# Patient Record
Sex: FEMALE | Race: BLACK OR AFRICAN AMERICAN | NOT HISPANIC OR LATINO | URBAN - METROPOLITAN AREA
[De-identification: names, ages, dates, MRNs, and addresses within clinical notes are randomized per-mention and may not be internally consistent; named-entity substitution may affect disease eponyms.]

---

## 2018-10-27 ENCOUNTER — EMERGENCY (EMERGENCY)
Facility: HOSPITAL | Age: 58
LOS: 0 days | Discharge: HOME | End: 2018-10-27
Attending: EMERGENCY MEDICINE
Payer: MEDICARE

## 2018-10-27 VITALS
DIASTOLIC BLOOD PRESSURE: 93 MMHG | HEIGHT: 66 IN | WEIGHT: 195.11 LBS | RESPIRATION RATE: 16 BRPM | SYSTOLIC BLOOD PRESSURE: 149 MMHG | TEMPERATURE: 98 F | HEART RATE: 75 BPM | OXYGEN SATURATION: 100 %

## 2018-10-27 VITALS
HEART RATE: 75 BPM | TEMPERATURE: 98 F | RESPIRATION RATE: 18 BRPM | DIASTOLIC BLOOD PRESSURE: 90 MMHG | SYSTOLIC BLOOD PRESSURE: 146 MMHG | OXYGEN SATURATION: 99 %

## 2018-10-27 DIAGNOSIS — S09.90XA UNSPECIFIED INJURY OF HEAD, INITIAL ENCOUNTER: ICD-10-CM

## 2018-10-27 DIAGNOSIS — S00.212A ABRASION OF LEFT EYELID AND PERIOCULAR AREA, INITIAL ENCOUNTER: ICD-10-CM

## 2018-10-27 DIAGNOSIS — Z91.013 ALLERGY TO SEAFOOD: ICD-10-CM
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
## 2018-10-27 DIAGNOSIS — J45.909 UNSPECIFIED ASTHMA, UNCOMPLICATED: ICD-10-CM
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
## 2018-10-27 DIAGNOSIS — Z90.12 ACQUIRED ABSENCE OF LEFT BREAST AND NIPPLE: Chronic | ICD-10-CM

## 2018-10-27 DIAGNOSIS — Z85.3 PERSONAL HISTORY OF MALIGNANT NEOPLASM OF BREAST: ICD-10-CM

## 2018-10-27 DIAGNOSIS — Z91.041 RADIOGRAPHIC DYE ALLERGY STATUS: ICD-10-CM

## 2018-10-27 DIAGNOSIS — E11.9 TYPE 2 DIABETES MELLITUS WITHOUT COMPLICATIONS: ICD-10-CM

## 2018-10-27 DIAGNOSIS — I10 ESSENTIAL (PRIMARY) HYPERTENSION: ICD-10-CM

## 2018-10-27 PROCEDURE — 99284 EMERGENCY DEPT VISIT MOD MDM: CPT

## 2018-10-27 PROCEDURE — 70450 CT HEAD/BRAIN W/O DYE: CPT | Mod: 26

## 2018-10-27 PROCEDURE — 70486 CT MAXILLOFACIAL W/O DYE: CPT | Mod: 26

## 2018-10-27 PROCEDURE — 76377 3D RENDER W/INTRP POSTPROCES: CPT | Mod: 26

## 2018-10-27 RX ORDER — TETANUS TOXOID, REDUCED DIPHTHERIA TOXOID AND ACELLULAR PERTUSSIS VACCINE, ADSORBED 5; 2.5; 8; 8; 2.5 [IU]/.5ML; [IU]/.5ML; UG/.5ML; UG/.5ML; UG/.5ML
0.5 SUSPENSION INTRAMUSCULAR ONCE
Qty: 0 | Refills: 0 | Status: COMPLETED | OUTPATIENT
Start: 2018-10-27 | End: 2018-10-27

## 2018-10-27 RX ORDER — ACETAMINOPHEN 500 MG
650 TABLET ORAL ONCE
Qty: 0 | Refills: 0 | Status: COMPLETED | OUTPATIENT
Start: 2018-10-27 | End: 2018-10-27

## 2018-10-27 RX ADMIN — Medication 650 MILLIGRAM(S): at 19:14

## 2018-10-27 RX ADMIN — Medication 650 MILLIGRAM(S): at 18:18

## 2018-10-27 RX ADMIN — TETANUS TOXOID, REDUCED DIPHTHERIA TOXOID AND ACELLULAR PERTUSSIS VACCINE, ADSORBED 0.5 MILLILITER(S): 5; 2.5; 8; 8; 2.5 SUSPENSION INTRAMUSCULAR at 21:11

## 2018-10-27 RX ADMIN — Medication 650 MILLIGRAM(S): at 21:13

## 2018-10-27 RX ADMIN — Medication 650 MILLIGRAM(S): at 20:28

## 2018-10-27 NOTE — ED ADULT TRIAGE NOTE - CHIEF COMPLAINT QUOTE
Slipped outside of [employer] on wet surface, face left orbit swollen, Positive LOC, abrasion on forehead, nausea

## 2018-10-27 NOTE — ED PROVIDER NOTE - OBJECTIVE STATEMENT
57yo female with pmh HTN, presents s/p slip and fall. It was raining a lot and pt slipped and hit left face and rt hand. + abrasion and swelling left periorbital and left maxilla. Witnssed by family, pt had a "pause for a sec". Pt states no LOC. Tdap utd. No AC.     ROS: No fever/chills. No photophobia/eye pain/changes in vision, No ear pain/sore throat/dysphagia, No chest pain/palpitations. No SOB/cough/stridor. No abdominal pain, N/V/D, no black/bloody bm. No dysuria/frequency/discharge, No headache. No Dizziness. + abrasion, face pain  No numbness/tingling/weakness.

## 2018-10-27 NOTE — ED ADULT NURSE NOTE - NSIMPLEMENTINTERV_GEN_ALL_ED
Implemented All Fall Risk Interventions:  [place] to call system. Call bell, personal items and telephone within reach. Instruct patient to call for assistance. Room bathroom lighting operational. Non-slip footwear when patient is off stretcher. Physically safe environment: no spills, clutter or unnecessary equipment. Stretcher in lowest position, wheels locked, appropriate side rails in place. Provide visual cue, wrist band, yellow gown, etc. Monitor gait and stability. Monitor for mental status changes and reorient to person, place, and time. Review medications for side effects contributing to fall risk. Reinforce activity limits and safety measures with patient and family.

## 2018-10-27 NOTE — ED PROVIDER NOTE - MEDICAL DECISION MAKING DETAILS
57 yo F s/p fall from standing  -ct brain/max shows no evidence of trauma, will d/c with pcp f/u as per plan with prior attending

## 2018-10-27 NOTE — ED ADULT NURSE NOTE - OBJECTIVE STATEMENT
Pt presented to the ed s/p mechanical fall. Positive loc. Pt c/o o headache, neck pain and feeling dizzy. Pt is not on any blood thinner. abraision notedon the left side of the forehead.

## 2018-10-27 NOTE — ED PROVIDER NOTE - PHYSICAL EXAMINATION
Gen: Alert, Well appearing. NAD    Head: NC, AT, PERRL, EOMI, normal lids/conjunctiva   ENT: Bilateral TM WNL, normal hearing, patent oropharynx without erythema/exudate, uvula midline  Neck: supple, no tenderness/meningismus/JVD   Pulm: Bilateral clear BS, normal resp effort, no wheeze/stridor/retractions  CV: RRR, no M/R/G, +dist pulses   Abd: soft, NT/ND, +BS, no guarding/rebound tenderness  Mskel: no edema/erythema/cyanosis   Skin: left lateral periorbital swelling with abrasion to left frontal bone, left maxillla swelling. no crepitus.   Neuro: AAOx3, no sensory/motor deficits, CN 2-12 intact

## 2018-10-27 NOTE — ED PROVIDER NOTE - PROGRESS NOTE DETAILS
Concerning neurological signs that would warrant return to ED were discussed with patient.  Pt. understands to return if severe headache, numbness, weakness, nausea, vomiting, or personality changes develop.  Pt. verbalizes understanding. Results reported to patient--grossly benign, CT shows no evidence of acute injury  Pt. reports feeling better after meds  pt. agrees to f/u with primary care outpt.  pt. understands to return to ED if symptoms worsen; will d/c

## 2019-01-17 ENCOUNTER — TRANSCRIPTION ENCOUNTER (OUTPATIENT)
Age: 59
End: 2019-01-17

## 2019-09-02 ENCOUNTER — INPATIENT (INPATIENT)
Facility: HOSPITAL | Age: 59
LOS: 3 days | Discharge: HOME | DRG: 206 | End: 2019-09-06
Attending: INTERNAL MEDICINE | Admitting: INTERNAL MEDICINE
Payer: COMMERCIAL

## 2019-09-02 VITALS
SYSTOLIC BLOOD PRESSURE: 173 MMHG | WEIGHT: 190.04 LBS | DIASTOLIC BLOOD PRESSURE: 110 MMHG | TEMPERATURE: 98 F | RESPIRATION RATE: 20 BRPM | HEART RATE: 110 BPM | HEIGHT: 66 IN | OXYGEN SATURATION: 97 %

## 2019-09-02 DIAGNOSIS — R07.9 CHEST PAIN, UNSPECIFIED: ICD-10-CM

## 2019-09-02 DIAGNOSIS — Z90.12 ACQUIRED ABSENCE OF LEFT BREAST AND NIPPLE: Chronic | ICD-10-CM

## 2019-09-02 LAB
ALBUMIN SERPL ELPH-MCNC: 3.8 G/DL — SIGNIFICANT CHANGE UP (ref 3.5–5)
ALP SERPL-CCNC: 134 U/L — HIGH (ref 40–120)
ALT FLD-CCNC: 23 U/L DA — SIGNIFICANT CHANGE UP (ref 10–60)
ANION GAP SERPL CALC-SCNC: 4 MMOL/L — LOW (ref 5–17)
APTT BLD: 36.7 SEC — HIGH (ref 27.5–36.3)
AST SERPL-CCNC: 23 U/L — SIGNIFICANT CHANGE UP (ref 10–40)
BILIRUB SERPL-MCNC: 0.2 MG/DL — SIGNIFICANT CHANGE UP (ref 0.2–1.2)
BUN SERPL-MCNC: 10 MG/DL — SIGNIFICANT CHANGE UP (ref 7–18)
CALCIUM SERPL-MCNC: 11 MG/DL — HIGH (ref 8.4–10.5)
CHLORIDE SERPL-SCNC: 110 MMOL/L — HIGH (ref 96–108)
CO2 SERPL-SCNC: 30 MMOL/L — SIGNIFICANT CHANGE UP (ref 22–31)
CREAT SERPL-MCNC: 0.89 MG/DL — SIGNIFICANT CHANGE UP (ref 0.5–1.3)
GLUCOSE SERPL-MCNC: 175 MG/DL — HIGH (ref 70–99)
HCT VFR BLD CALC: 34.4 % — LOW (ref 34.5–45)
HGB BLD-MCNC: 11.2 G/DL — LOW (ref 11.5–15.5)
INR BLD: 0.99 RATIO — SIGNIFICANT CHANGE UP (ref 0.88–1.16)
MCHC RBC-ENTMCNC: 30 PG — SIGNIFICANT CHANGE UP (ref 27–34)
MCHC RBC-ENTMCNC: 32.6 GM/DL — SIGNIFICANT CHANGE UP (ref 32–36)
MCV RBC AUTO: 92.2 FL — SIGNIFICANT CHANGE UP (ref 80–100)
NRBC # BLD: 0 /100 WBCS — SIGNIFICANT CHANGE UP (ref 0–0)
NT-PROBNP SERPL-SCNC: 27 PG/ML — SIGNIFICANT CHANGE UP (ref 0–125)
PLATELET # BLD AUTO: 240 K/UL — SIGNIFICANT CHANGE UP (ref 150–400)
POTASSIUM SERPL-MCNC: 4 MMOL/L — SIGNIFICANT CHANGE UP (ref 3.5–5.3)
POTASSIUM SERPL-SCNC: 4 MMOL/L — SIGNIFICANT CHANGE UP (ref 3.5–5.3)
PROT SERPL-MCNC: 8.4 G/DL — HIGH (ref 6–8.3)
PROTHROM AB SERPL-ACNC: 11 SEC — SIGNIFICANT CHANGE UP (ref 10–12.9)
RBC # BLD: 3.73 M/UL — LOW (ref 3.8–5.2)
RBC # FLD: 13.8 % — SIGNIFICANT CHANGE UP (ref 10.3–14.5)
SODIUM SERPL-SCNC: 144 MMOL/L — SIGNIFICANT CHANGE UP (ref 135–145)
TROPONIN I SERPL-MCNC: <0.015 NG/ML — SIGNIFICANT CHANGE UP (ref 0–0.04)
WBC # BLD: 8.42 K/UL — SIGNIFICANT CHANGE UP (ref 3.8–10.5)
WBC # FLD AUTO: 8.42 K/UL — SIGNIFICANT CHANGE UP (ref 3.8–10.5)

## 2019-09-02 PROCEDURE — 99285 EMERGENCY DEPT VISIT HI MDM: CPT

## 2019-09-02 PROCEDURE — 71045 X-RAY EXAM CHEST 1 VIEW: CPT | Mod: 26

## 2019-09-02 RX ORDER — MORPHINE SULFATE 50 MG/1
4 CAPSULE, EXTENDED RELEASE ORAL ONCE
Refills: 0 | Status: DISCONTINUED | OUTPATIENT
Start: 2019-09-02 | End: 2019-09-02

## 2019-09-02 RX ORDER — ENOXAPARIN SODIUM 100 MG/ML
90 INJECTION SUBCUTANEOUS ONCE
Refills: 0 | Status: COMPLETED | OUTPATIENT
Start: 2019-09-02 | End: 2019-09-02

## 2019-09-02 RX ORDER — DIPHENHYDRAMINE HCL 50 MG
50 CAPSULE ORAL ONCE
Refills: 0 | Status: COMPLETED | OUTPATIENT
Start: 2019-09-03 | End: 2019-09-03

## 2019-09-02 RX ADMIN — MORPHINE SULFATE 4 MILLIGRAM(S): 50 CAPSULE, EXTENDED RELEASE ORAL at 23:44

## 2019-09-02 RX ADMIN — Medication 40 MILLIGRAM(S): at 23:44

## 2019-09-02 NOTE — ED PROVIDER NOTE - CARE PLAN
Principal Discharge DX:	Chest pain, unspecified type  Secondary Diagnosis:	Dyspnea, unspecified type  Secondary Diagnosis:	Palpitations

## 2019-09-02 NOTE — ED PROVIDER NOTE - OBJECTIVE STATEMENT
57 y/o woman, h/o HTN, DM, asthma, breast cancer and lumpectomy, now in remission, c/o left chest pain (pleuritic), shortness of breath, palpitations since about 7 pm, started at rest.  Pt also reports dizziness, nausea, headache as well.  No fever/cough/hemoptysis/leg pain/swelling/vomiting/syncope.  No recent travel, no h/o DVT/PE/ACS, no use of hormonal medications.  Pt has h/o allergy to iodine and shellfish, possible IV contrast allergy.

## 2019-09-02 NOTE — ED ADULT NURSE NOTE - OBJECTIVE STATEMENT
The patient presents with left sided chest pain since this AM with sob.  She states that she was in the shower when the pain started.  She is feeling pleuritic chest pain as well, but the chest pain is constant.  She is also with nausea and headache.

## 2019-09-02 NOTE — ED ADULT TRIAGE NOTE - CHIEF COMPLAINT QUOTE
pt compliant of left side chest pain going to the left side of her back that started this afternoon.

## 2019-09-02 NOTE — ED PROVIDER NOTE - CLINICAL SUMMARY MEDICAL DECISION MAKING FREE TEXT BOX
57 y/o woman, h/o HTN, DM, asthma, breast cancer and lumpectomy, now in remission, c/o left chest pain (pleuritic), shortness of breath, palpitations since about 7 pm--EKG, CXR, labs with troponin; moderate suspicion for pulmonary embolism but with h/o allergy, will start steroid prep and give empiric dose of Lovenox while awaiting CTA chest result to r/o PE.

## 2019-09-02 NOTE — ED PROVIDER NOTE - PROGRESS NOTE DETAILS
Informed Dr. Walters and MAR for admission.  Since it will be several hours for steroid prep and PE rule-out and there is moderate-high suspicion of PE, will give one dose of SC Lovenox empirically for now.

## 2019-09-02 NOTE — ED PROVIDER NOTE - MUSCULOSKELETAL, MLM
Spine appears normal, range of motion is not limited, no muscle or joint tenderness; no leg tenderness/swelling

## 2019-09-03 DIAGNOSIS — E11.9 TYPE 2 DIABETES MELLITUS WITHOUT COMPLICATIONS: ICD-10-CM

## 2019-09-03 DIAGNOSIS — R09.89 OTHER SPECIFIED SYMPTOMS AND SIGNS INVOLVING THE CIRCULATORY AND RESPIRATORY SYSTEMS: ICD-10-CM

## 2019-09-03 DIAGNOSIS — I10 ESSENTIAL (PRIMARY) HYPERTENSION: ICD-10-CM

## 2019-09-03 DIAGNOSIS — Z29.9 ENCOUNTER FOR PROPHYLACTIC MEASURES, UNSPECIFIED: ICD-10-CM

## 2019-09-03 DIAGNOSIS — J45.909 UNSPECIFIED ASTHMA, UNCOMPLICATED: ICD-10-CM

## 2019-09-03 DIAGNOSIS — R06.02 SHORTNESS OF BREATH: ICD-10-CM

## 2019-09-03 DIAGNOSIS — R07.9 CHEST PAIN, UNSPECIFIED: ICD-10-CM

## 2019-09-03 PROBLEM — C50.919 MALIGNANT NEOPLASM OF UNSPECIFIED SITE OF UNSPECIFIED FEMALE BREAST: Chronic | Status: ACTIVE | Noted: 2018-10-27

## 2019-09-03 LAB
24R-OH-CALCIDIOL SERPL-MCNC: 28.3 NG/ML — LOW (ref 30–80)
ALBUMIN SERPL ELPH-MCNC: 3.9 G/DL — SIGNIFICANT CHANGE UP (ref 3.5–5)
ALP SERPL-CCNC: 129 U/L — HIGH (ref 40–120)
ALT FLD-CCNC: 23 U/L DA — SIGNIFICANT CHANGE UP (ref 10–60)
ANION GAP SERPL CALC-SCNC: 3 MMOL/L — LOW (ref 5–17)
AST SERPL-CCNC: 13 U/L — SIGNIFICANT CHANGE UP (ref 10–40)
BASOPHILS # BLD AUTO: 0.02 K/UL — SIGNIFICANT CHANGE UP (ref 0–0.2)
BASOPHILS NFR BLD AUTO: 0.3 % — SIGNIFICANT CHANGE UP (ref 0–2)
BILIRUB SERPL-MCNC: 0.4 MG/DL — SIGNIFICANT CHANGE UP (ref 0.2–1.2)
BUN SERPL-MCNC: 10 MG/DL — SIGNIFICANT CHANGE UP (ref 7–18)
CALCIUM SERPL-MCNC: 10.7 MG/DL — HIGH (ref 8.4–10.5)
CHLORIDE SERPL-SCNC: 112 MMOL/L — HIGH (ref 96–108)
CHOLEST SERPL-MCNC: 147 MG/DL — SIGNIFICANT CHANGE UP (ref 10–199)
CK MB BLD-MCNC: 0.8 % — SIGNIFICANT CHANGE UP (ref 0–3.5)
CK MB CFR SERPL CALC: 1.1 NG/ML — SIGNIFICANT CHANGE UP (ref 0–3.6)
CK SERPL-CCNC: 136 U/L — SIGNIFICANT CHANGE UP (ref 21–215)
CO2 SERPL-SCNC: 29 MMOL/L — SIGNIFICANT CHANGE UP (ref 22–31)
CREAT SERPL-MCNC: 0.88 MG/DL — SIGNIFICANT CHANGE UP (ref 0.5–1.3)
EOSINOPHIL # BLD AUTO: 0 K/UL — SIGNIFICANT CHANGE UP (ref 0–0.5)
EOSINOPHIL NFR BLD AUTO: 0 % — SIGNIFICANT CHANGE UP (ref 0–6)
FOLATE SERPL-MCNC: 19.2 NG/ML — SIGNIFICANT CHANGE UP
GLUCOSE BLDC GLUCOMTR-MCNC: 115 MG/DL — HIGH (ref 70–99)
GLUCOSE BLDC GLUCOMTR-MCNC: 135 MG/DL — HIGH (ref 70–99)
GLUCOSE BLDC GLUCOMTR-MCNC: 152 MG/DL — HIGH (ref 70–99)
GLUCOSE BLDC GLUCOMTR-MCNC: 201 MG/DL — HIGH (ref 70–99)
GLUCOSE BLDC GLUCOMTR-MCNC: 210 MG/DL — HIGH (ref 70–99)
GLUCOSE SERPL-MCNC: 202 MG/DL — HIGH (ref 70–99)
HBA1C BLD-MCNC: 6.6 % — HIGH (ref 4–5.6)
HCT VFR BLD CALC: 34.9 % — SIGNIFICANT CHANGE UP (ref 34.5–45)
HCV AB S/CO SERPL IA: 0.2 S/CO — SIGNIFICANT CHANGE UP (ref 0–0.99)
HCV AB SERPL-IMP: SIGNIFICANT CHANGE UP
HDLC SERPL-MCNC: 75 MG/DL — SIGNIFICANT CHANGE UP
HGB BLD-MCNC: 11.3 G/DL — LOW (ref 11.5–15.5)
IMM GRANULOCYTES NFR BLD AUTO: 0.3 % — SIGNIFICANT CHANGE UP (ref 0–1.5)
LIPID PNL WITH DIRECT LDL SERPL: 64 MG/DL — SIGNIFICANT CHANGE UP
LYMPHOCYTES # BLD AUTO: 0.54 K/UL — LOW (ref 1–3.3)
LYMPHOCYTES # BLD AUTO: 6.8 % — LOW (ref 13–44)
MAGNESIUM SERPL-MCNC: 2.1 MG/DL — SIGNIFICANT CHANGE UP (ref 1.6–2.6)
MCHC RBC-ENTMCNC: 29.4 PG — SIGNIFICANT CHANGE UP (ref 27–34)
MCHC RBC-ENTMCNC: 32.4 GM/DL — SIGNIFICANT CHANGE UP (ref 32–36)
MCV RBC AUTO: 90.9 FL — SIGNIFICANT CHANGE UP (ref 80–100)
MONOCYTES # BLD AUTO: 0.08 K/UL — SIGNIFICANT CHANGE UP (ref 0–0.9)
MONOCYTES NFR BLD AUTO: 1 % — LOW (ref 2–14)
NEUTROPHILS # BLD AUTO: 7.24 K/UL — SIGNIFICANT CHANGE UP (ref 1.8–7.4)
NEUTROPHILS NFR BLD AUTO: 91.6 % — HIGH (ref 43–77)
NRBC # BLD: 0 /100 WBCS — SIGNIFICANT CHANGE UP (ref 0–0)
PHOSPHATE SERPL-MCNC: 1.7 MG/DL — LOW (ref 2.5–4.5)
PLATELET # BLD AUTO: 249 K/UL — SIGNIFICANT CHANGE UP (ref 150–400)
POTASSIUM SERPL-MCNC: 4.2 MMOL/L — SIGNIFICANT CHANGE UP (ref 3.5–5.3)
POTASSIUM SERPL-SCNC: 4.2 MMOL/L — SIGNIFICANT CHANGE UP (ref 3.5–5.3)
PROT SERPL-MCNC: 8.2 G/DL — SIGNIFICANT CHANGE UP (ref 6–8.3)
RBC # BLD: 3.84 M/UL — SIGNIFICANT CHANGE UP (ref 3.8–5.2)
RBC # FLD: 13.8 % — SIGNIFICANT CHANGE UP (ref 10.3–14.5)
SODIUM SERPL-SCNC: 144 MMOL/L — SIGNIFICANT CHANGE UP (ref 135–145)
TOTAL CHOLESTEROL/HDL RATIO MEASUREMENT: 2 RATIO — LOW (ref 3.3–7.1)
TRIGL SERPL-MCNC: 42 MG/DL — SIGNIFICANT CHANGE UP (ref 10–149)
TROPONIN I SERPL-MCNC: <0.015 NG/ML — SIGNIFICANT CHANGE UP (ref 0–0.04)
TROPONIN I SERPL-MCNC: <0.015 NG/ML — SIGNIFICANT CHANGE UP (ref 0–0.04)
TSH SERPL-MCNC: 0.73 UU/ML — SIGNIFICANT CHANGE UP (ref 0.34–4.82)
VIT B12 SERPL-MCNC: 465 PG/ML — SIGNIFICANT CHANGE UP (ref 232–1245)
WBC # BLD: 7.9 K/UL — SIGNIFICANT CHANGE UP (ref 3.8–10.5)
WBC # FLD AUTO: 7.9 K/UL — SIGNIFICANT CHANGE UP (ref 3.8–10.5)

## 2019-09-03 PROCEDURE — 71275 CT ANGIOGRAPHY CHEST: CPT | Mod: 26

## 2019-09-03 RX ORDER — DEXTROSE 50 % IN WATER 50 %
15 SYRINGE (ML) INTRAVENOUS ONCE
Refills: 0 | Status: DISCONTINUED | OUTPATIENT
Start: 2019-09-03 | End: 2019-09-06

## 2019-09-03 RX ORDER — VERAPAMIL HCL 240 MG
40 CAPSULE, EXTENDED RELEASE PELLETS 24 HR ORAL DAILY
Refills: 0 | Status: DISCONTINUED | OUTPATIENT
Start: 2019-09-03 | End: 2019-09-03

## 2019-09-03 RX ORDER — DEXTROSE 50 % IN WATER 50 %
25 SYRINGE (ML) INTRAVENOUS ONCE
Refills: 0 | Status: DISCONTINUED | OUTPATIENT
Start: 2019-09-03 | End: 2019-09-06

## 2019-09-03 RX ORDER — ENOXAPARIN SODIUM 100 MG/ML
40 INJECTION SUBCUTANEOUS DAILY
Refills: 0 | Status: DISCONTINUED | OUTPATIENT
Start: 2019-09-03 | End: 2019-09-06

## 2019-09-03 RX ORDER — GLUCAGON INJECTION, SOLUTION 0.5 MG/.1ML
1 INJECTION, SOLUTION SUBCUTANEOUS ONCE
Refills: 0 | Status: DISCONTINUED | OUTPATIENT
Start: 2019-09-03 | End: 2019-09-06

## 2019-09-03 RX ORDER — METOPROLOL TARTRATE 50 MG
12.5 TABLET ORAL
Refills: 0 | Status: DISCONTINUED | OUTPATIENT
Start: 2019-09-03 | End: 2019-09-03

## 2019-09-03 RX ORDER — ACETAMINOPHEN 500 MG
650 TABLET ORAL ONCE
Refills: 0 | Status: COMPLETED | OUTPATIENT
Start: 2019-09-03 | End: 2019-09-03

## 2019-09-03 RX ORDER — VERAPAMIL HCL 240 MG
80 CAPSULE, EXTENDED RELEASE PELLETS 24 HR ORAL THREE TIMES A DAY
Refills: 0 | Status: DISCONTINUED | OUTPATIENT
Start: 2019-09-03 | End: 2019-09-04

## 2019-09-03 RX ORDER — INSULIN LISPRO 100/ML
VIAL (ML) SUBCUTANEOUS
Refills: 0 | Status: DISCONTINUED | OUTPATIENT
Start: 2019-09-03 | End: 2019-09-06

## 2019-09-03 RX ORDER — ASPIRIN/CALCIUM CARB/MAGNESIUM 324 MG
81 TABLET ORAL DAILY
Refills: 0 | Status: DISCONTINUED | OUTPATIENT
Start: 2019-09-03 | End: 2019-09-06

## 2019-09-03 RX ORDER — ATORVASTATIN CALCIUM 80 MG/1
40 TABLET, FILM COATED ORAL AT BEDTIME
Refills: 0 | Status: DISCONTINUED | OUTPATIENT
Start: 2019-09-03 | End: 2019-09-06

## 2019-09-03 RX ORDER — INFLUENZA VIRUS VACCINE 15; 15; 15; 15 UG/.5ML; UG/.5ML; UG/.5ML; UG/.5ML
0.5 SUSPENSION INTRAMUSCULAR ONCE
Refills: 0 | Status: COMPLETED | OUTPATIENT
Start: 2019-09-03 | End: 2019-09-03

## 2019-09-03 RX ORDER — SODIUM CHLORIDE 9 MG/ML
1000 INJECTION, SOLUTION INTRAVENOUS
Refills: 0 | Status: DISCONTINUED | OUTPATIENT
Start: 2019-09-03 | End: 2019-09-06

## 2019-09-03 RX ORDER — DEXTROSE 50 % IN WATER 50 %
12.5 SYRINGE (ML) INTRAVENOUS ONCE
Refills: 0 | Status: DISCONTINUED | OUTPATIENT
Start: 2019-09-03 | End: 2019-09-06

## 2019-09-03 RX ORDER — LISINOPRIL 2.5 MG/1
10 TABLET ORAL DAILY
Refills: 0 | Status: DISCONTINUED | OUTPATIENT
Start: 2019-09-03 | End: 2019-09-04

## 2019-09-03 RX ORDER — IPRATROPIUM/ALBUTEROL SULFATE 18-103MCG
3 AEROSOL WITH ADAPTER (GRAM) INHALATION EVERY 8 HOURS
Refills: 0 | Status: DISCONTINUED | OUTPATIENT
Start: 2019-09-03 | End: 2019-09-06

## 2019-09-03 RX ADMIN — MORPHINE SULFATE 4 MILLIGRAM(S): 50 CAPSULE, EXTENDED RELEASE ORAL at 00:15

## 2019-09-03 RX ADMIN — Medication 50 MILLIGRAM(S): at 02:59

## 2019-09-03 RX ADMIN — Medication 12.5 MILLIGRAM(S): at 17:10

## 2019-09-03 RX ADMIN — Medication 650 MILLIGRAM(S): at 22:49

## 2019-09-03 RX ADMIN — Medication 650 MILLIGRAM(S): at 14:40

## 2019-09-03 RX ADMIN — Medication 40 MILLIGRAM(S): at 02:58

## 2019-09-03 RX ADMIN — ENOXAPARIN SODIUM 90 MILLIGRAM(S): 100 INJECTION SUBCUTANEOUS at 00:17

## 2019-09-03 RX ADMIN — ENOXAPARIN SODIUM 40 MILLIGRAM(S): 100 INJECTION SUBCUTANEOUS at 11:42

## 2019-09-03 RX ADMIN — Medication 4: at 11:44

## 2019-09-03 RX ADMIN — Medication 81 MILLIGRAM(S): at 11:42

## 2019-09-03 RX ADMIN — Medication 650 MILLIGRAM(S): at 23:25

## 2019-09-03 RX ADMIN — Medication 2: at 17:11

## 2019-09-03 RX ADMIN — Medication 650 MILLIGRAM(S): at 15:10

## 2019-09-03 RX ADMIN — LISINOPRIL 10 MILLIGRAM(S): 2.5 TABLET ORAL at 16:07

## 2019-09-03 NOTE — PATIENT PROFILE ADULT - STATED REASON FOR ADMISSION
SUBJECTIVE:  Nurse's note reviewed and accepted.   Medications and Allergies reviewed   Laurence Orellana is a 63 year old female who presents today for follow-up of her type 2 diabetes mellitus  She states that she is feeling well; offering no complaints of hypoglycemic symptoms, nausea, vomiting, chest pain, fatigue, heartburn, extremity pain, or change in bladder or bowel habits.  She continues to take the medications listed without adverse reactions or side effects. Home glucose readings are reviewed pt needs new machine and strips. .    Having bilateral foot pain. Sees Podiatry     Weight loss: pt is actively trying.     Eyes: Lasor surgery again on her left eye.     Smoking and is on the quit smoking program. Motivated to quit again. Pt is in the program at [facility].     Hemoglobin A1C   Date Value   02/22/2017 6.1 % (H)   06/24/2015 8.5     CHOLESTEROL (mg/dL)   Date Value   02/22/2017 208 (H)   01/05/2016 149     HDL (mg/dL)   Date Value   02/22/2017 58 (L)   01/05/2016 51     CALCULATED LDL (mg/dL)   Date Value   02/22/2017 135 (H)   01/05/2016 78     TRIGLYCERIDE (mg/dL)   Date Value   02/22/2017 73   01/05/2016 98     No components found for: DLDL  Creatinine (mg/dL)   Date Value   02/22/2017 0.82   01/05/2016 0.82     MICROALBUMIN, UA (TTL) (mg/dL)   Date Value   03/08/2017 1.67   02/23/2016 3.23     MICROALBUMIN/CREATININE (mcg/mg)   Date Value   03/08/2017 27.3   02/23/2016 15.2       Past Medical History:   Diagnosis Date   • Arthritis 2012   • Bunion    • Depression    • Diabetes mellitus (CMS/HCC) 2010   • Difficulty in walking(719.7)    • Gallstones    • Other and unspecified hyperlipidemia    • Rash         Past Surgical History:   Procedure Laterality Date   • Cataract extraction, bilateral  2011    Dr. Segura   • Colonoscopy  6/20/2013    normal colonoscopy, 10yr recall Affi 2023   • Eye surgery     • Hysterectomy  1973    partial        Current Outpatient Prescriptions   Medication Sig Dispense  chest pain Refill   • metformin (GLUCOPHAGE) 1000 MG tablet TAKE 1 TABLET BY MOUTH 2 TIMES DAILY (WITH MEALS). 180 tablet 1   • insulin glargine (LANTUS SOLOSTAR) 100 UNIT/ML pen-injector Decrease to 40 units per day. Please disp one month and adjust amount needed. 15 mL 6   • atorvastatin (LIPITOR) 10 MG tablet TAKE 1 TABLET BY MOUTH EVERY EVENING. 90 tablet 0   • pregabalin (LYRICA) 200 MG capsule Take 1 capsule by mouth 2 times daily. 60 capsule 4   • aspirin 81 MG EC tablet TAKE 1 TABLET BY MOUTH EVERY DAY 30 tablet 11   • Blood Glucose Monitoring Suppl w/Device Kit Test  3 times daily. Give one month supply of meter/strips/lancets as preferred by insurance. Diagnosis: E11.9 1 kit 0   • Blood Glucose Monitoring Suppl Supplies Misc Test 3 times daily. Give 1 month supply test strips as preferred by insurance. Diagnosis: E11.9 Type 2 Diabetes 100 each 11   • Blood Glucose Monitoring Suppl Supplies Misc Test 3 times daily. Give 1 month supply  lancets as preferred by insurance. Diagnosis: E11.9 Type 2 Diabetes 100 each 11   • guaiFENesin (MUCINEX) 600 MG 12 hr tablet Take 2 tablets by mouth 2 times daily. 60 tablet 0   • beclomethasone diprop (QVAR) 40 MCG/ACT inhaler Inhale 1 puff into the lungs 2 times daily. 1 Inhaler 5   • UNIFINE PENTIPS 31G X 6 MM Misc USE WITH LANTUS AT NIGHT 100 each 11   • PROAIR  (90 BASE) MCG/ACT inhaler INHALE TWO PUFFS BY MOUTH EVERY 4 HOURS AS NEEDED FOR SHORTNESS OF BREATH O SILBIDOS 8.5 g 3   • mometasone (NASONEX) 50 MCG/ACT nasal spray Spray 2 sprays in each nostril daily as needed. Indications: Hayfever     • naproxen (NAPROSYN) 500 MG tablet Take 1 tablet by mouth 2 times daily. 60 tablet 6   • DISPENSE Please dispense: One four wheeled walker with seat 1 each 0     No current facility-administered medications for this visit.         OBJECTIVE:  Visit Vitals  /64 (BP Location: [facility] [place], Patient Position: Sitting, Cuff Size: Large Adult)   Pulse 73   Temp 97.9 °F (36.6 °C) (Oral)    Ht 5' 1\" (1.549 m)   Wt 85.7 kg   SpO2 97%   BMI 35.71 kg/m²     General appearance: Healthy  normocephalic  Neck: Supple, w/o adenopathy or cervical masses. No JVD noted. Carotid pulsations palp  Skin: Skin color, texture, turgor are normal. There are no bruises, rashes or lesions.  Chest: Symmetric. Normal motion on quiet respiration. No palpable tenderness.  Lungs: restful breathing effort noted, Percussion normal. Good diaphragmatic excursion. Lungs clear  Heart: negative  Abdomen: soft, non-tender, bowel sounds normal, no masses, hepatomegaly or splenomegaly noted  Extremities: sl bilateral edema non pitting.   Neuro: walks with a cane. Speech is slurred which is ongoing same as in prior visits.      ASSESSMENT:  1. Diabetes  2. Ongoing slurred speech ( seen by Neurology )  3 Foot pain    Plan  1. Discussed stopping alcohol in coping.  2. Pt to follow up with BS  3. Diabetes supplies called in to pharmacy.  4. Follow up for ongoing issues.         Return for diabetes visit in 3 months. Patient is reminded to have blood work done 1 week prior to visit.

## 2019-09-03 NOTE — H&P ADULT - HISTORY OF PRESENT ILLNESS
Pt is a 57 y/o F with PMH of asthma, HTN, Breast cancer, DM, Hypothyroidism who presented with left sided chest  pain and worsening shortness of breath since yesterday. According to the patient,  yesterday evening she initially developed pain on her left shoulder blade and later dull  left chest pain  of 8/10 intensity, radiating across the chest. She  he been having on and off mild shortness of breath since 1 month which was worse yesterday. Shortness of breath increases on exertion. She has some nausea and occasional lightheadedness.   She denied cough, fever, abdominal pain, vomiting and all other review of systems except mentioned above. Pt is a 59 y/o F with PMH of asthma, HTN, Left  Breast cancer on remission (s/p chemo and radiation on 2008), DM, Hypothyroidism who presented with left sided chest  pain and worsening shortness of breath since yesterday. According to the patient,  yesterday evening she initially developed pain on her left shoulder blade and later dull  left chest pain  of 8/10 intensity, radiating across the chest. She  he been having on and off mild shortness of breath since 1 month which was worse yesterday. Shortness of breath increases on exertion. She has some nausea and occasional lightheadedness.   She denied cough, fever, abdominal pain, vomiting and all other review of systems except mentioned above.

## 2019-09-03 NOTE — H&P ADULT - PROBLEM SELECTOR PLAN 6
IMPROVE VTE Individual Risk Assessment  RISK                                                          Points  [  ] Previous VTE                                                3  [  ] Thrombophilia                                             2  [  ] Lower limb paralysis                                    2        (unable to hold up >15 seconds)    [  ] Current Cancer                                             2         (within 6 months)  [  x] Immobilization > 24 hrs                              1  [  ] ICU/CCU stay > 24 hours                            1  [x  ] Age > 60                                                    1  IMPROVE VTE Score _________

## 2019-09-03 NOTE — H&P ADULT - NEUROLOGICAL DETAILS
sensation intact/cranial nerves intact/superficial reflexes intact/alert and oriented x 3/normal strength/deep reflexes intact

## 2019-09-03 NOTE — H&P ADULT - PROBLEM SELECTOR PLAN 5
IMPROVE VTE Individual Risk Assessment  RISK                                                          Points  [  ] Previous VTE                                                3  [  ] Thrombophilia                                             2  [  ] Lower limb paralysis                                    2        (unable to hold up >15 seconds)    [  ] Current Cancer                                             2         (within 6 months)  [  x] Immobilization > 24 hrs                              1  [  ] ICU/CCU stay > 24 hours                            1  [x  ] Age > 60                                                    1  IMPROVE VTE Score _________ Pt  was prescribed metformin 500 BID by her PCP but, she was non compliant   F/u HBA1C  Monitor blood glucose  Continue Sliding scale Humalog

## 2019-09-03 NOTE — CONSULT NOTE ADULT - SUBJECTIVE AND OBJECTIVE BOX
HISTORY OF PRESENT ILLNESS: HPI:  Pt is a 59 y/o F with PMH of asthma, HTN, Left  Breast cancer on remission (s/p chemo and radiation on 2008), DM, Hypothyroidism who presented with left sided chest  pain and worsening shortness of breath since yesterday. According to the patient,  yesterday evening she initially developed pain on her left shoulder blade and later dull  left chest pain  of 8/10 intensity, radiating across the chest. She  he been having on and off mild shortness of breath since 1 month which was worse yesterday. Shortness of breath increases on exertion. She has some nausea and occasional lightheadedness.   She denied cough, fever, abdominal pain, vomiting and all other review of systems except mentioned above.  She denies known history of CHF or CAD reports normal Cath few years ago      PAST MEDICAL & SURGICAL HISTORY:  Breast CA  DM (diabetes mellitus)  HTN (hypertension)  Asthma  H/O left mastectomy          MEDICATIONS:  MEDICATIONS  (STANDING):  ALBUTerol/ipratropium for Nebulization 3 milliLiter(s) Nebulizer every 8 hours  aspirin enteric coated 81 milliGRAM(s) Oral daily  atorvastatin 40 milliGRAM(s) Oral at bedtime  dextrose 5%. 1000 milliLiter(s) (50 mL/Hr) IV Continuous <Continuous>  dextrose 50% Injectable 12.5 Gram(s) IV Push once  dextrose 50% Injectable 25 Gram(s) IV Push once  dextrose 50% Injectable 25 Gram(s) IV Push once  enoxaparin Injectable 40 milliGRAM(s) SubCutaneous daily  influenza   Vaccine 0.5 milliLiter(s) IntraMuscular once  insulin lispro (HumaLOG) corrective regimen sliding scale   SubCutaneous three times a day before meals  metoprolol tartrate 12.5 milliGRAM(s) Oral two times a day      Allergies    iodine (Short breath)  shellfish (Short breath)    Intolerances        FAMILY HISTORY:  No pertinent family history in first degree relatives    Non-contributary for premature coronary disease or sudden cardiac death    SOCIAL HISTORY:    [X ] Non-smoker  [ ] Smoker  [ ] Alcohol      REVIEW OF SYSTEMS:  [X ]chest pain  [ X]shortness of breath  [  ]palpitations  [  ]syncope  [ ]near syncope [ ]upper extremity weakness   [ ] lower extremity weakness  [  ]diplopia  [  ]altered mental status   [  ]fevers  [ ]chills [ ]nausea  [ ]vomitting  [  ]dysphagia    [ ]abdominal pain  [ ]melena  [ ]BRBPR    [  ]epistaxis  [  ]rash    [ ]lower extremity edema        [ X] All others negative	  [ ] Unable to obtain      LABS:	 	    CARDIAC MARKERS:  CARDIAC MARKERS ( 03 Sep 2019 10:39 )  <0.015 ng/mL / x     / 136 U/L / x     / 1.1 ng/mL  CARDIAC MARKERS ( 03 Sep 2019 04:44 )  <0.015 ng/mL / x     / x     / x     / x      CARDIAC MARKERS ( 02 Sep 2019 21:22 )  <0.015 ng/mL / x     / x     / x     / x                                  11.3   7.90  )-----------( 249      ( 03 Sep 2019 04:44 )             34.9     Hb Trend: 11.3<--, 11.2<--    09-03    144  |  112<H>  |  10  ----------------------------<  202<H>  4.2   |  29  |  0.88    Ca    10.7<H>      03 Sep 2019 04:44  Phos  1.7     09-03  Mg     2.1     09-03    TPro  8.2  /  Alb  3.9  /  TBili  0.4  /  DBili  x   /  AST  13  /  ALT  23  /  AlkPhos  129<H>  09-03    Creatinine Trend: 0.88<--, 0.89<--    Coags:  PT/INR - ( 02 Sep 2019 21:22 )   PT: 11.0 sec;   INR: 0.99 ratio         PTT - ( 02 Sep 2019 21:22 )  PTT:36.7 sec    proBNP: Serum Pro-Brain Natriuretic Peptide: 27 pg/mL (09-02 @ 21:22)    Lipid Profile:   HgA1c: Hemoglobin A1C, Whole Blood: 6.6 % (09-03 @ 09:59)    TSH: Thyroid Stimulating Hormone, Serum: 0.73 uU/mL (09-03 @ 04:44)          PHYSICAL EXAM:  T(C): 36.8 (09-03-19 @ 11:01), Max: 36.9 (09-02-19 @ 20:46)  HR: 93 (09-03-19 @ 11:01) (92 - 110)  BP: 149/89 (09-03-19 @ 11:01) (127/82 - 173/110)  RR: 19 (09-03-19 @ 11:01) (17 - 20)  SpO2: 98% (09-03-19 @ 11:01) (96% - 100%)  Wt(kg): --  I&O's Summary      Gen: Appears well in NAD  HEENT:  (-)icterus (-)pallor  CV: N S1 S2 1/6 THERESA (+)2 Pulses B/l  Resp:  Clear to ausculatation B/L, normal effort  GI: (+) BS Soft, NT, ND  Lymph:  (-)Edema, (-)obvious lymphadenopathy  Skin: Warm to touch, Normal turgor  Psych: Appropriate mood and affect        TELEMETRY: 	  Sinus    ECG:  	Sinus Tach IRBBB 108 BPM    RADIOLOGY:         CXR:   No infiltrate     ASSESSMENT/PLAN: 	58y Female   PMH of asthma, HTN, Left  Breast cancer on remission (s/p chemo and radiation on 2008), DM, Hypothyroidism who presented with left sided chest  pain and worsening shortness of breath since yesterday r/o for PE and MI.    - F/u echo  - Plan for stress in AM if no pertinent findings on echo    I once again thank you for allowing me to participate in the care of your patient.  If you have any questions or concerns please do not hesitate to contact me.      Joel Gruber MD, [facility]  BEEPER [phone]

## 2019-09-03 NOTE — H&P ADULT - PROBLEM SELECTOR PLAN 3
Pt uses catapres patch, ramipril. She mentions using verapamil for HTN. Not sure about her dose.  Monitor BP Pt has h/o asthma and uses Breo as needed at home  Continue bronchodilator

## 2019-09-03 NOTE — H&P ADULT - ASSESSMENT
Pt is a 59 y/o F with PMH of asthma, HTN, Breast cancer, DM, Hypothyroidism who presented with left sided chest  pain and worsening shortness of breath since yesterday.                          11.2   8.42  )-----------( 240      ( 02 Sep 2019 21:22 )             34.4       09-02    144  |  110<H>  |  10  ----------------------------<  175<H>  4.0   |  30  |  0.89    Ca    11.0<H>      02 Sep 2019 21:22    TPro  8.4<H>  /  Alb  3.8  /  TBili  0.2  /  DBili  x   /  AST  23  /  ALT  23  /  AlkPhos  134<H>  09-02                                      3

## 2019-09-03 NOTE — H&P ADULT - NSHPSOCIALHISTORY_GEN_ALL_CORE
Pt used to inhale cocaine, quit 10 years back. She denied smoking cigarettes. She occasionally drinks alcohol

## 2019-09-03 NOTE — H&P ADULT - PROBLEM SELECTOR PLAN 4
Pt  was prescribed metformin 500 BID by her PCP but, she was non compliant   F/u HBA1C  Monitor blood glucose  Continue Sliding scale Humalog Pt uses catapres patch, ramipril. She mentions using verapamil for HTN. Not sure about her dose.  Monitor BP

## 2019-09-03 NOTE — H&P ADULT - PROBLEM SELECTOR PLAN 1
Likely musculoskeletal chest pain as tenderness on palpation on left chest.  EKG showed sinus tachycardia   Troponin T1 je  F/u serial cardiac enzymes  Continue aspirin and statin  F/u Echocardiogram

## 2019-09-03 NOTE — CHART NOTE - NSCHARTNOTEFT_GEN_A_CORE
paged by rn that pt is concerned why she isnt getting home BP medication. Morning team to call pharmacy and check the dose of verapamil. Pt isn't sure about the dose. starting her with 80mg TID for now

## 2019-09-03 NOTE — H&P ADULT - PROBLEM SELECTOR PLAN 2
Pt has worsening shortness of breath since yesterday  Suspected PE as pt has h/o breast cancer, sinus tachycardia. Low wells score.  pt is allergic to contrast.   CTA chest  after premedication with steroids and benadryl Pt has worsening shortness of breath since yesterday  Suspected PE as pt has h/o breast cancer, sinus tachycardia. Low wells score.  pt is allergic to contrast. CTA chest  done after premedication with steroids and benadryl.   F/u CTA chest

## 2019-09-04 ENCOUNTER — TRANSCRIPTION ENCOUNTER (OUTPATIENT)
Age: 59
End: 2019-09-04

## 2019-09-04 LAB
GLUCOSE BLDC GLUCOMTR-MCNC: 121 MG/DL — HIGH (ref 70–99)
GLUCOSE BLDC GLUCOMTR-MCNC: 135 MG/DL — HIGH (ref 70–99)
GLUCOSE BLDC GLUCOMTR-MCNC: 137 MG/DL — HIGH (ref 70–99)
GLUCOSE BLDC GLUCOMTR-MCNC: 172 MG/DL — HIGH (ref 70–99)
HBA1C BLD-MCNC: 6.7 % — HIGH (ref 4–5.6)

## 2019-09-04 RX ORDER — AMLODIPINE BESYLATE 2.5 MG/1
10 TABLET ORAL DAILY
Refills: 0 | Status: DISCONTINUED | OUTPATIENT
Start: 2019-09-04 | End: 2019-09-06

## 2019-09-04 RX ORDER — ATORVASTATIN CALCIUM 80 MG/1
1 TABLET, FILM COATED ORAL
Qty: 30 | Refills: 0
Start: 2019-09-04 | End: 2019-10-03

## 2019-09-04 RX ORDER — ASPIRIN/CALCIUM CARB/MAGNESIUM 324 MG
1 TABLET ORAL
Qty: 30 | Refills: 0
Start: 2019-09-04 | End: 2019-10-03

## 2019-09-04 RX ORDER — LOSARTAN POTASSIUM 100 MG/1
50 TABLET, FILM COATED ORAL DAILY
Refills: 0 | Status: DISCONTINUED | OUTPATIENT
Start: 2019-09-04 | End: 2019-09-06

## 2019-09-04 RX ORDER — ACETAMINOPHEN 500 MG
650 TABLET ORAL EVERY 6 HOURS
Refills: 0 | Status: DISCONTINUED | OUTPATIENT
Start: 2019-09-04 | End: 2019-09-06

## 2019-09-04 RX ORDER — VERAPAMIL HCL 240 MG
1 CAPSULE, EXTENDED RELEASE PELLETS 24 HR ORAL
Qty: 0 | Refills: 0 | DISCHARGE
Start: 2019-09-04

## 2019-09-04 RX ORDER — HYDROCHLOROTHIAZIDE 25 MG
12.5 TABLET ORAL DAILY
Refills: 0 | Status: DISCONTINUED | OUTPATIENT
Start: 2019-09-04 | End: 2019-09-06

## 2019-09-04 RX ORDER — VERAPAMIL HCL 240 MG
80 CAPSULE, EXTENDED RELEASE PELLETS 24 HR ORAL THREE TIMES A DAY
Refills: 0 | Status: DISCONTINUED | OUTPATIENT
Start: 2019-09-04 | End: 2019-09-04

## 2019-09-04 RX ORDER — LISINOPRIL 2.5 MG/1
10 TABLET ORAL DAILY
Refills: 0 | Status: DISCONTINUED | OUTPATIENT
Start: 2019-09-04 | End: 2019-09-04

## 2019-09-04 RX ORDER — ACETAMINOPHEN 500 MG
2 TABLET ORAL
Qty: 0 | Refills: 0 | DISCHARGE
Start: 2019-09-04

## 2019-09-04 RX ORDER — LISINOPRIL 2.5 MG/1
1 TABLET ORAL
Qty: 14 | Refills: 0
Start: 2019-09-04 | End: 2019-09-17

## 2019-09-04 RX ADMIN — Medication 2: at 17:10

## 2019-09-04 RX ADMIN — ATORVASTATIN CALCIUM 40 MILLIGRAM(S): 80 TABLET, FILM COATED ORAL at 21:31

## 2019-09-04 RX ADMIN — Medication 650 MILLIGRAM(S): at 14:30

## 2019-09-04 RX ADMIN — Medication 3 MILLILITER(S): at 14:55

## 2019-09-04 RX ADMIN — ENOXAPARIN SODIUM 40 MILLIGRAM(S): 100 INJECTION SUBCUTANEOUS at 11:26

## 2019-09-04 RX ADMIN — Medication 650 MILLIGRAM(S): at 13:48

## 2019-09-04 RX ADMIN — Medication 80 MILLIGRAM(S): at 21:31

## 2019-09-04 RX ADMIN — Medication 80 MILLIGRAM(S): at 06:13

## 2019-09-04 RX ADMIN — Medication 81 MILLIGRAM(S): at 11:29

## 2019-09-04 RX ADMIN — LISINOPRIL 10 MILLIGRAM(S): 2.5 TABLET ORAL at 17:10

## 2019-09-04 RX ADMIN — Medication 80 MILLIGRAM(S): at 14:21

## 2019-09-04 RX ADMIN — AMLODIPINE BESYLATE 10 MILLIGRAM(S): 2.5 TABLET ORAL at 23:10

## 2019-09-04 NOTE — PROGRESS NOTE ADULT - SUBJECTIVE AND OBJECTIVE BOX
Patient is a 58y old  Female who presents with a chief complaint of chest pain (04 Sep 2019 13:03)    PATIENT IS SEEN AND EXAMINED IN MEDICAL FLOOR.    ALLERGIES:  iodine (Short breath)  shellfish (Short breath)  Daily Weight in k.9 (04 Sep 2019 00:59)    VITALS:    Vital Signs Last 24 Hrs  T(C): 36.7 (04 Sep 2019 21:22), Max: 37.1 (04 Sep 2019 18:17)  T(F): 98.1 (04 Sep 2019 21:22), Max: 98.8 (04 Sep 2019 18:17)  HR: 75 (04 Sep 2019 21:22) (72 - 90)  BP: 141/89 (04 Sep 2019 21:22) (138/92 - 148/102)  BP(mean): --  RR: 16 (04 Sep 2019 21:22) (16 - 18)  SpO2: 100% (04 Sep 2019 21:22) (96% - 100%)    LABS:    CBC Full  -  ( 03 Sep 2019 04:44 )  WBC Count : 7.90 K/uL  RBC Count : 3.84 M/uL  Hemoglobin : 11.3 g/dL  Hematocrit : 34.9 %  Platelet Count - Automated : 249 K/uL  Mean Cell Volume : 90.9 fl  Mean Cell Hemoglobin : 29.4 pg  Mean Cell Hemoglobin Concentration : 32.4 gm/dL  Auto Neutrophil # : 7.24 K/uL  Auto Lymphocyte # : 0.54 K/uL  Auto Monocyte # : 0.08 K/uL  Auto Eosinophil # : 0.00 K/uL  Auto Basophil # : 0.02 K/uL  Auto Neutrophil % : 91.6 %  Auto Lymphocyte % : 6.8 %  Auto Monocyte % : 1.0 %  Auto Eosinophil % : 0.0 %  Auto Basophil % : 0.3 %          144  |  112<H>  |  10  ----------------------------<  202<H>  4.2   |  29  |  0.88    Ca    10.7<H>      03 Sep 2019 04:44  Phos  1.7       Mg     2.1         TPro  8.2  /  Alb  3.9  /  TBili  0.4  /  DBili  x   /  AST  13  /  ALT  23  /  AlkPhos  129<H>      CAPILLARY BLOOD GLUCOSE      POCT Blood Glucose.: 137 mg/dL (04 Sep 2019 21:57)  POCT Blood Glucose.: 172 mg/dL (04 Sep 2019 16:54)  POCT Blood Glucose.: 135 mg/dL (04 Sep 2019 11:57)  POCT Blood Glucose.: 121 mg/dL (04 Sep 2019 07:45)    CARDIAC MARKERS ( 03 Sep 2019 10:39 )  <0.015 ng/mL / x     / 136 U/L / x     / 1.1 ng/mL  CARDIAC MARKERS ( 03 Sep 2019 04:44 )  <0.015 ng/mL / x     / x     / x     / x          LIVER FUNCTIONS - ( 03 Sep 2019 04:44 )  Alb: 3.9 g/dL / Pro: 8.2 g/dL / ALK PHOS: 129 U/L / ALT: 23 U/L DA / AST: 13 U/L / GGT: x           Creatinine Trend: 0.88<--, 0.89<--  I&O's Summary      MEDICATIONS:    MEDICATIONS  (STANDING):  ALBUTerol/ipratropium for Nebulization 3 milliLiter(s) Nebulizer every 8 hours  aspirin enteric coated 81 milliGRAM(s) Oral daily  atorvastatin 40 milliGRAM(s) Oral at bedtime  dextrose 5%. 1000 milliLiter(s) (50 mL/Hr) IV Continuous <Continuous>  dextrose 50% Injectable 12.5 Gram(s) IV Push once  dextrose 50% Injectable 25 Gram(s) IV Push once  dextrose 50% Injectable 25 Gram(s) IV Push once  enoxaparin Injectable 40 milliGRAM(s) SubCutaneous daily  influenza   Vaccine 0.5 milliLiter(s) IntraMuscular once  insulin lispro (HumaLOG) corrective regimen sliding scale   SubCutaneous three times a day before meals  lisinopril 10 milliGRAM(s) Oral daily  verapamil 80 milliGRAM(s) Oral three times a day      MEDICATIONS  (PRN):  acetaminophen   Tablet .. 650 milliGRAM(s) Oral every 6 hours PRN Temp greater or equal to 38C (100.4F), Mild Pain (1 - 3), Moderate Pain (4 - 6)  dextrose 40% Gel 15 Gram(s) Oral once PRN Blood Glucose LESS THAN 70 milliGRAM(s)/deciliter  glucagon  Injectable 1 milliGRAM(s) IntraMuscular once PRN Glucose LESS THAN 70 milligrams/deciliter      REVIEW OF SYSTEMS:                           ALL ROS DONE [ X   ]    CONSTITUTIONAL:  LETHARGIC [   ], FEVER [   ], UNRESPONSIVE [   ]  CVS:  CP  [   ], SOB, [   ], PALPITATIONS [   ], DIZZYNESS [   ]  RS: COUGH [   ], SPUTUM [   ]  GI: ABDOMINAL PAIN [   ], NAUSEA [   ], VOMITINGS [   ], DIARRHEA [   ], CONSTIPATION [   ]  :  DYSURIA [   ], NOCTURIA [   ], INCREASED FREQUENCY [   ], DRIBLING [   ],  SKELETAL: PAINFUL JOINTS [   ], SWOLLEN JOINTS [   ], NECK ACHE [   ], LOW BACK ACHE [   ],  SKIN : ULCERS [   ], RASH [   ], ITCHING [   ]  CNS: HEAD ACHE [   ], DOUBLE VISION [   ], BLURRED VISION [   ], AMS / CONFUSION [   ], SEIZURES [   ], WEAKNESS [   ],TINGLING / NUMBNESS [   ]    PHYSICAL EXAMINATION:  GENERAL APPEARANCE: NO DISTRESS  HEENT:  NO PALLOR, NO  JVD,  NO   NODES, NECK SUPPLE  CVS: S1 +, S2 +,   RS: AEEB,  OCCASIONAL  RALES +,   NO RONCHI  ABD: SOFT, NT, NO, BS +  EXT: NO PE  SKIN: WARM,   SKELETAL:  ROM ACCEPTABLE                TENDER LEFT COSTOCHONDRAL JUNCTIONS +  CNS:  AAO X 3   , NO  DEFICITS    RADIOLOGY :    < from: CT Angio Chest w/ IV Cont (19 @ 04:25) >  IMPRESSION:    Negative for pulmonary emboli.    Trace left pleural effusion with adjacent opacities, likely represent   compressive atelectasis. Recommend clinical correlation to assess for   superimposed infection. Mild right basilar dependent atelectasis.     Heterogeneous thyroid lobes containing suspected nodules for which   nonemergent ultrasound correlation is advised.     Incompletely characterized bilateral breast as well as chest nodules with   adjacent calcifications. Breast imagingcorrelation is advised.    Prominent anterior mediastinal lymph node.    < end of copied text >        ASSESSMENT :     Chest pain  Breast CA  DM (diabetes mellitus)  HTN (hypertension)  Asthma  H/O left mastectomy      PLAN:  HPI:  Pt is a 59 y/o F with PMH of asthma, HTN, Left  Breast cancer on remission (s/p chemo and radiation on ), DM, Hypothyroidism who presented with left sided chest  pain and worsening shortness of breath since yesterday. According to the patient,  yesterday evening she initially developed pain on her left shoulder blade and later dull  left chest pain  of 8/10 intensity, radiating across the chest. She  he been having on and off mild shortness of breath since 1 month which was worse yesterday. Shortness of breath increases on exertion. She has some nausea and occasional lightheadedness.   She denied cough, fever, abdominal pain, vomiting and all other review of systems except mentioned above. (03 Sep 2019 01:09)    - CHEST PAIN. ACS IS RULED OUT. STRESS TEST IS NEGATIVE  - COSTOCHONDRITIS - ADD NSAID - NAPROSYN  - UNCONTROLLED HTN. DC VERAPAMIL. ADD AMLODIPINE   - DC PLAN HOME  - OOB AS TOLERATED  - DR. RUSSELL

## 2019-09-04 NOTE — DISCHARGE NOTE PROVIDER - HOSPITAL COURSE
Pt is a 59 y/o F with PMH of asthma, HTN, Left  Breast cancer on remission (s/p chemo and radiation on 2008), DM, Hypothyroidism who presented with left sided chest  pain and worsening shortness of breath since yesterday. According to the patient,  yesterday evening she initially developed pain on her left shoulder blade and later dull  left chest pain  of 8/10 intensity, radiating across the chest. She  he been having on and off mild shortness of breath since 1 month which was worse yesterday. Shortness of breath increases on exertion. She has some nausea and occasional lightheadedness.     She denied cough, fever, abdominal pain, vomiting and all other review of systems except mentioned above. Pt is a 57 y/o F with PMH of asthma, HTN, Left  Breast cancer on remission (s/p chemo and radiation on 2008), DM, Hypothyroidism who presented with left sided chest  pain and worsening shortness of breath since yesterday. According to the patient,  yesterday evening she initially developed pain on her left shoulder blade and later dull  left chest pain  of 8/10 intensity, radiating across the chest. She  he been having on and off mild shortness of breath since 1 month which was worse yesterday. Shortness of breath increases on exertion. She has some nausea and occasional lightheadedness.     She denied cough, fever, abdominal pain, vomiting and all other review of systems except mentioned above.     Patient presented with chest pain for which she was admitted for ACS rule out. Your cardiac troponin were trended and were negative. EKG showed no ischemic changes. cardiologist was involved in care and recommended nuclear stress test abd echo. echo showed normal LV dysfunction and nuclear stress test is negative for ischemia.    Patient is stable for discharge per cardiologist and primary attending.    you will follow up with your primary cardiologist in 1 week

## 2019-09-04 NOTE — DISCHARGE NOTE PROVIDER - NSDCCPCAREPLAN_GEN_ALL_CORE_FT
PRINCIPAL DISCHARGE DIAGNOSIS  Diagnosis: Chest pain, unspecified type  Assessment and Plan of Treatment:       SECONDARY DISCHARGE DIAGNOSES  Diagnosis: Palpitations  Assessment and Plan of Treatment: PRINCIPAL DISCHARGE DIAGNOSIS  Diagnosis: Chest pain, unspecified type  Assessment and Plan of Treatment: Patient presented with chest pain for which she was admitted for ACS rule out. Your cardiac troponin were trended and were negative. EKG showed no ischemic changes. cardiologist was involved in care and recommended nuclear stress test abd echo. echo showed normal LV dysfunction and nuclear stress test is negative for ischemia.  Patient is stable for discharge per cardiologist and primary attending.  you will follow up with your primary cardiologist in 1 week PRINCIPAL DISCHARGE DIAGNOSIS  Diagnosis: Chest pain, unspecified type  Assessment and Plan of Treatment: Patient presented with chest pain for which she was admitted for ACS rule out. Your cardiac troponin were trended and were negative. EKG showed no ischemic changes. cardiologist was involved in care and recommended nuclear stress test abd echo. echo showed normal LV dysfunction and nuclear stress test is negative for ischemia. Follow up T3 T4 levels as outpatient.  Patient is stable for discharge per cardiologist and primary attending.  you will follow up with your primary cardiologist in 1 week

## 2019-09-04 NOTE — PROGRESS NOTE ADULT - SUBJECTIVE AND OBJECTIVE BOX
Patient denies chest pain or shortness of breath.   Review of systems otherwise (-)  	  MEDICATIONS:  MEDICATIONS  (STANDING):  ALBUTerol/ipratropium for Nebulization 3 milliLiter(s) Nebulizer every 8 hours  aspirin enteric coated 81 milliGRAM(s) Oral daily  atorvastatin 40 milliGRAM(s) Oral at bedtime  dextrose 5%. 1000 milliLiter(s) (50 mL/Hr) IV Continuous <Continuous>  dextrose 50% Injectable 12.5 Gram(s) IV Push once  dextrose 50% Injectable 25 Gram(s) IV Push once  dextrose 50% Injectable 25 Gram(s) IV Push once  enoxaparin Injectable 40 milliGRAM(s) SubCutaneous daily  influenza   Vaccine 0.5 milliLiter(s) IntraMuscular once  insulin lispro (HumaLOG) corrective regimen sliding scale   SubCutaneous three times a day before meals  lisinopril 10 milliGRAM(s) Oral daily  verapamil 80 milliGRAM(s) Oral three times a day      LABS:	 	    CARDIAC MARKERS:  CARDIAC MARKERS ( 03 Sep 2019 10:39 )  <0.015 ng/mL / x     / 136 U/L / x     / 1.1 ng/mL  CARDIAC MARKERS ( 03 Sep 2019 04:44 )  <0.015 ng/mL / x     / x     / x     / x      CARDIAC MARKERS ( 02 Sep 2019 21:22 )  <0.015 ng/mL / x     / x     / x     / x                                    11.3   7.90  )-----------( 249      ( 03 Sep 2019 04:44 )             34.9     Hemoglobin: 11.3 g/dL (09-03 @ 04:44)  Hemoglobin: 11.2 g/dL (09-02 @ 21:22)      09-03    144  |  112<H>  |  10  ----------------------------<  202<H>  4.2   |  29  |  0.88    Ca    10.7<H>      03 Sep 2019 04:44  Phos  1.7     09-03  Mg     2.1     09-03    TPro  8.2  /  Alb  3.9  /  TBili  0.4  /  DBili  x   /  AST  13  /  ALT  23  /  AlkPhos  129<H>  09-03    Creatinine Trend: 0.88<--, 0.89<--      HgA1c: Hemoglobin A1C, Whole Blood: 6.7 % (09-04 @ 10:02)        PHYSICAL EXAM:  T(C): 36.9 (09-04-19 @ 10:32), Max: 37.1 (09-03-19 @ 18:19)  HR: 83 (09-04-19 @ 10:32) (74 - 98)  BP: 141/93 (09-04-19 @ 10:32) (141/84 - 165/91)  RR: 16 (09-04-19 @ 10:32) (16 - 18)  SpO2: 99% (09-04-19 @ 10:32) (96% - 100%)  Wt(kg): --  I&O's Summary        Gen: Appears well in NAD  HEENT:  (-)icterus (-)pallor  CV: N S1 S2 1/6 THERESA (+)2 Pulses B/l  Resp:  Clear to ausculatation B/L, normal effort  GI: (+) BS Soft, NT, ND  Lymph:  (-)Edema, (-)obvious lymphadenopathy  Skin: Warm to touch, Normal turgor  Psych: Appropriate mood and affect      TELEMETRY: 	  Sinus        ASSESSMENT/PLAN: 	58y  Female PMH of asthma, HTN, Left  Breast cancer on remission (s/p chemo and radiation on 2008), DM, Hypothyroidism who presented with left sided chest  pain and worsening shortness of breath since yesterday r/o for PE and MI.    - Echo with normal LV function  - Stress with no ischemia  - No need for further inpatient cardiac work up.  - D/C planning per med  - f/u with primary cardiologist     Joel Gruber MD, [facility]  BEEPER [phone]

## 2019-09-05 LAB
ANION GAP SERPL CALC-SCNC: 7 MMOL/L — SIGNIFICANT CHANGE UP (ref 5–17)
BUN SERPL-MCNC: 9 MG/DL — SIGNIFICANT CHANGE UP (ref 7–18)
CALCIUM SERPL-MCNC: 10.8 MG/DL — HIGH (ref 8.4–10.5)
CHLORIDE SERPL-SCNC: 108 MMOL/L — SIGNIFICANT CHANGE UP (ref 96–108)
CO2 SERPL-SCNC: 28 MMOL/L — SIGNIFICANT CHANGE UP (ref 22–31)
CREAT SERPL-MCNC: 0.8 MG/DL — SIGNIFICANT CHANGE UP (ref 0.5–1.3)
GLUCOSE BLDC GLUCOMTR-MCNC: 108 MG/DL — HIGH (ref 70–99)
GLUCOSE BLDC GLUCOMTR-MCNC: 127 MG/DL — HIGH (ref 70–99)
GLUCOSE BLDC GLUCOMTR-MCNC: 140 MG/DL — HIGH (ref 70–99)
GLUCOSE BLDC GLUCOMTR-MCNC: 151 MG/DL — HIGH (ref 70–99)
GLUCOSE SERPL-MCNC: 136 MG/DL — HIGH (ref 70–99)
HCT VFR BLD CALC: 35.7 % — SIGNIFICANT CHANGE UP (ref 34.5–45)
HGB BLD-MCNC: 11.8 G/DL — SIGNIFICANT CHANGE UP (ref 11.5–15.5)
MCHC RBC-ENTMCNC: 29.9 PG — SIGNIFICANT CHANGE UP (ref 27–34)
MCHC RBC-ENTMCNC: 33.1 GM/DL — SIGNIFICANT CHANGE UP (ref 32–36)
MCV RBC AUTO: 90.4 FL — SIGNIFICANT CHANGE UP (ref 80–100)
NRBC # BLD: 0 /100 WBCS — SIGNIFICANT CHANGE UP (ref 0–0)
PLATELET # BLD AUTO: 242 K/UL — SIGNIFICANT CHANGE UP (ref 150–400)
POTASSIUM SERPL-MCNC: 3.5 MMOL/L — SIGNIFICANT CHANGE UP (ref 3.5–5.3)
POTASSIUM SERPL-SCNC: 3.5 MMOL/L — SIGNIFICANT CHANGE UP (ref 3.5–5.3)
RBC # BLD: 3.95 M/UL — SIGNIFICANT CHANGE UP (ref 3.8–5.2)
RBC # FLD: 13.6 % — SIGNIFICANT CHANGE UP (ref 10.3–14.5)
SODIUM SERPL-SCNC: 143 MMOL/L — SIGNIFICANT CHANGE UP (ref 135–145)
WBC # BLD: 6.78 K/UL — SIGNIFICANT CHANGE UP (ref 3.8–10.5)
WBC # FLD AUTO: 6.78 K/UL — SIGNIFICANT CHANGE UP (ref 3.8–10.5)

## 2019-09-05 PROCEDURE — 93010 ELECTROCARDIOGRAM REPORT: CPT

## 2019-09-05 RX ORDER — ALPRAZOLAM 0.25 MG
0.5 TABLET ORAL ONCE
Refills: 0 | Status: DISCONTINUED | OUTPATIENT
Start: 2019-09-05 | End: 2019-09-05

## 2019-09-05 RX ORDER — METOPROLOL TARTRATE 50 MG
5 TABLET ORAL ONCE
Refills: 0 | Status: COMPLETED | OUTPATIENT
Start: 2019-09-05 | End: 2019-09-05

## 2019-09-05 RX ORDER — DILTIAZEM HCL 120 MG
30 CAPSULE, EXT RELEASE 24 HR ORAL ONCE
Refills: 0 | Status: COMPLETED | OUTPATIENT
Start: 2019-09-05 | End: 2019-09-05

## 2019-09-05 RX ORDER — SODIUM CHLORIDE 9 MG/ML
500 INJECTION INTRAMUSCULAR; INTRAVENOUS; SUBCUTANEOUS ONCE
Refills: 0 | Status: COMPLETED | OUTPATIENT
Start: 2019-09-05 | End: 2019-09-05

## 2019-09-05 RX ADMIN — Medication 81 MILLIGRAM(S): at 11:07

## 2019-09-05 RX ADMIN — ATORVASTATIN CALCIUM 40 MILLIGRAM(S): 80 TABLET, FILM COATED ORAL at 21:43

## 2019-09-05 RX ADMIN — LOSARTAN POTASSIUM 50 MILLIGRAM(S): 100 TABLET, FILM COATED ORAL at 06:01

## 2019-09-05 RX ADMIN — ENOXAPARIN SODIUM 40 MILLIGRAM(S): 100 INJECTION SUBCUTANEOUS at 11:07

## 2019-09-05 RX ADMIN — Medication 0.5 MILLIGRAM(S): at 09:17

## 2019-09-05 RX ADMIN — Medication 30 MILLIGRAM(S): at 11:11

## 2019-09-05 RX ADMIN — AMLODIPINE BESYLATE 10 MILLIGRAM(S): 2.5 TABLET ORAL at 06:01

## 2019-09-05 RX ADMIN — Medication 12.5 MILLIGRAM(S): at 06:01

## 2019-09-05 RX ADMIN — Medication 0.5 MILLIGRAM(S): at 21:43

## 2019-09-05 RX ADMIN — SODIUM CHLORIDE 1000 MILLILITER(S): 9 INJECTION INTRAMUSCULAR; INTRAVENOUS; SUBCUTANEOUS at 11:07

## 2019-09-05 NOTE — PROGRESS NOTE ADULT - SUBJECTIVE AND OBJECTIVE BOX
Patient denies chest pain or shortness of breath felt transient dizziness when she got up this morning, tele revealed sinus tach.   Review of systems otherwise (-)  	  acetaminophen   Tablet .. 650 milliGRAM(s) Oral every 6 hours PRN  ALBUTerol/ipratropium for Nebulization 3 milliLiter(s) Nebulizer every 8 hours  amLODIPine   Tablet 10 milliGRAM(s) Oral daily  aspirin enteric coated 81 milliGRAM(s) Oral daily  atorvastatin 40 milliGRAM(s) Oral at bedtime  dextrose 40% Gel 15 Gram(s) Oral once PRN  dextrose 5%. 1000 milliLiter(s) IV Continuous <Continuous>  dextrose 50% Injectable 12.5 Gram(s) IV Push once  dextrose 50% Injectable 25 Gram(s) IV Push once  dextrose 50% Injectable 25 Gram(s) IV Push once  enoxaparin Injectable 40 milliGRAM(s) SubCutaneous daily  glucagon  Injectable 1 milliGRAM(s) IntraMuscular once PRN  hydrochlorothiazide 12.5 milliGRAM(s) Oral daily  influenza   Vaccine 0.5 milliLiter(s) IntraMuscular once  insulin lispro (HumaLOG) corrective regimen sliding scale   SubCutaneous three times a day before meals  losartan 50 milliGRAM(s) Oral daily                            11.8   6.78  )-----------( 242      ( 05 Sep 2019 07:26 )             35.7       Hemoglobin: 11.8 g/dL (09-05 @ 07:26)  Hemoglobin: 11.3 g/dL (09-03 @ 04:44)  Hemoglobin: 11.2 g/dL (09-02 @ 21:22)      09-05    143  |  108  |  9   ----------------------------<  136<H>  3.5   |  28  |  0.80    Ca    10.8<H>      05 Sep 2019 07:26      Creatinine Trend: 0.80<--, 0.88<--, 0.89<--    COAGS:     CARDIAC MARKERS ( 03 Sep 2019 10:39 )  <0.015 ng/mL / x     / 136 U/L / x     / 1.1 ng/mL  CARDIAC MARKERS ( 03 Sep 2019 04:44 )  <0.015 ng/mL / x     / x     / x     / x      CARDIAC MARKERS ( 02 Sep 2019 21:22 )  <0.015 ng/mL / x     / x     / x     / x            T(C): 37.1 (09-05-19 @ 05:37), Max: 37.1 (09-04-19 @ 18:17)  HR: 67 (09-05-19 @ 05:37) (67 - 90)  BP: 137/87 (09-05-19 @ 05:37) (136/88 - 148/102)  RR: 18 (09-05-19 @ 05:37) (16 - 18)  SpO2: 97% (09-05-19 @ 05:37) (97% - 100%)  Wt(kg): --    I&O's Summary      Gen: Appears well in NAD  HEENT:  (-)icterus (-)pallor  CV: N S1 S2 1/6 THERESA (+)2 Pulses B/l  Resp:  Clear to ausculatation B/L, normal effort  GI: (+) BS Soft, NT, ND  Lymph:  (-)Edema, (-)obvious lymphadenopathy  Skin: Warm to touch, Normal turgor  Psych: Appropriate mood and affect      TELEMETRY: 	  Sinus        ASSESSMENT/PLAN: 	58y  Female PMH of asthma, HTN, Left  Breast cancer on remission (s/p chemo and radiation on 2008), DM, Hypothyroidism who presented with left sided chest  pain and worsening shortness of breath since yesterday r/o for PE and MI.    - Echo with normal LV function  - Stress with no ischemia  - Check orthostatic BP  - Administer  cc Bolus and reassess    Joel Gruber MD, [facility]  BEEPER [phone]

## 2019-09-05 NOTE — PROGRESS NOTE ADULT - ASSESSMENT
Pt is a 57 y/o F with PMH of asthma, HTN, Breast cancer, DM, Hypothyroidism who presented with left sided chest  pain and worsening shortness of breath since yesterday.                          11.2   8.42  )-----------( 240      ( 02 Sep 2019 21:22 )             34.4       09-02    144  |  110<H>  |  10  ----------------------------<  175<H>  4.0   |  30  |  0.89    Ca    11.0<H>      02 Sep 2019 21:22    TPro  8.4<H>  /  Alb  3.8  /  TBili  0.2  /  DBili  x   /  AST  23  /  ALT  23  /  AlkPhos  134<H>  09-02                                      3

## 2019-09-05 NOTE — PROGRESS NOTE ADULT - ATTENDING COMMENTS
PATIENT IS SEEN AND EXAMINED  - CHEST PAIN - ACS IS RULED OUT , NEGATIVE CARDIAC STRESS TEST  - COSTOCHONDRITIS - ADD NAPROSYN 500 MG BID X 5 DAYS   - UNCONTROLLED HTN - Rx CHANGED FROM 09-04-19 TO NORVASC, HCTZ, COZAAR  - PANIC ATTACKS, SINUS TACHYCARDIA - REASSURED PATIENT & FAMILY. XANAX PRN GIVEN  - OLD CANCER OF BREAST, ASTHMA  - DC PLAN HOME IN AM   - D/W FAMILY AT BED SIDE AT LENGTH  - GI AND DVT PROPHYLAXIS  - DR. RUSSELL

## 2019-09-05 NOTE — PROGRESS NOTE ADULT - PROBLEM SELECTOR PLAN 5
Pt  was prescribed metformin 500 BID by her PCP but, she was non compliant   F/u HBA1C  Monitor blood glucose  Continue Sliding scale Humalog

## 2019-09-05 NOTE — PROGRESS NOTE ADULT - SUBJECTIVE AND OBJECTIVE BOX
PGY 1 Note discussed with supervising resident and primary attending    Patient is a 58y old  Female who presents with a chief complaint of CHEST PAIN (04 Sep 2019 22:06)      INTERVAL HPI/OVERNIGHT EVENTS: offers no new complaints; current symptoms resolving    MEDICATIONS  (STANDING):  ALBUTerol/ipratropium for Nebulization 3 milliLiter(s) Nebulizer every 8 hours  amLODIPine   Tablet 10 milliGRAM(s) Oral daily  aspirin enteric coated 81 milliGRAM(s) Oral daily  atorvastatin 40 milliGRAM(s) Oral at bedtime  dextrose 5%. 1000 milliLiter(s) (50 mL/Hr) IV Continuous <Continuous>  dextrose 50% Injectable 12.5 Gram(s) IV Push once  dextrose 50% Injectable 25 Gram(s) IV Push once  dextrose 50% Injectable 25 Gram(s) IV Push once  enoxaparin Injectable 40 milliGRAM(s) SubCutaneous daily  hydrochlorothiazide 12.5 milliGRAM(s) Oral daily  influenza   Vaccine 0.5 milliLiter(s) IntraMuscular once  insulin lispro (HumaLOG) corrective regimen sliding scale   SubCutaneous three times a day before meals  losartan 50 milliGRAM(s) Oral daily    MEDICATIONS  (PRN):  acetaminophen   Tablet .. 650 milliGRAM(s) Oral every 6 hours PRN Temp greater or equal to 38C (100.4F), Mild Pain (1 - 3), Moderate Pain (4 - 6)  dextrose 40% Gel 15 Gram(s) Oral once PRN Blood Glucose LESS THAN 70 milliGRAM(s)/deciliter  glucagon  Injectable 1 milliGRAM(s) IntraMuscular once PRN Glucose LESS THAN 70 milligrams/deciliter      __________________________________________________  REVIEW OF SYSTEMS:    CONSTITUTIONAL: No fever,   EYES: no acute visual disturbances  NECK: No pain or stiffness  RESPIRATORY: No cough; No shortness of breath  CARDIOVASCULAR: No chest pain, no palpitations  GASTROINTESTINAL: No pain. No nausea or vomiting; No diarrhea   NEUROLOGICAL: No headache or numbness, no tremors  MUSCULOSKELETAL: No joint pain, no muscle pain  GENITOURINARY: no dysuria, no frequency, no hesitancy  PSYCHIATRY: no depression , no anxiety  ALL OTHER  ROS negative        Vital Signs Last 24 Hrs  T(C): 36.7 (05 Sep 2019 14:32), Max: 37.1 (04 Sep 2019 18:17)  T(F): 98.1 (05 Sep 2019 14:32), Max: 98.8 (04 Sep 2019 18:17)  HR: 66 (05 Sep 2019 14:32) (66 - 90)  BP: 112/74 (05 Sep 2019 14:32) (112/74 - 146/95)  BP(mean): --  RR: 16 (05 Sep 2019 14:32) (16 - 18)  SpO2: 96% (05 Sep 2019 14:32) (96% - 100%)    ________________________________________________  PHYSICAL EXAM:  GENERAL: NAD  HEENT: Normocephalic;  conjunctivae and sclerae clear; moist mucous membranes;   NECK : supple  CHEST/LUNG: Clear to auscultation bilaterally with good air entry   HEART: S1 S2  regular; no murmurs, gallops or rubs  ABDOMEN: Soft, Nontender, Nondistended; Bowel sounds present  EXTREMITIES: no cyanosis; no edema; no calf tenderness  SKIN: warm and dry; no rash  NERVOUS SYSTEM:  Awake and alert; Oriented  to place, person and time ; no new deficits    _________________________________________________  LABS:                        11.8   6.78  )-----------( 242      ( 05 Sep 2019 07:26 )             35.7     09-05    143  |  108  |  9   ----------------------------<  136<H>  3.5   |  28  |  0.80    Ca    10.8<H>      05 Sep 2019 07:26          CAPILLARY BLOOD GLUCOSE      POCT Blood Glucose.: 140 mg/dL (05 Sep 2019 11:32)  POCT Blood Glucose.: 127 mg/dL (05 Sep 2019 07:39)  POCT Blood Glucose.: 137 mg/dL (04 Sep 2019 21:57)  POCT Blood Glucose.: 172 mg/dL (04 Sep 2019 16:54)        RADIOLOGY & ADDITIONAL TESTS:    Imaging Personally Reviewed:  YES/NO    Consultant(s) Notes Reviewed:   YES/ No    Care Discussed with Consultants :     Plan of care was discussed with patient and /or primary care giver; all questions and concerns were addressed and care was aligned with patient's wishes.

## 2019-09-06 ENCOUNTER — TRANSCRIPTION ENCOUNTER (OUTPATIENT)
Age: 59
End: 2019-09-06

## 2019-09-06 VITALS — DIASTOLIC BLOOD PRESSURE: 93 MMHG | HEART RATE: 85 BPM | SYSTOLIC BLOOD PRESSURE: 130 MMHG

## 2019-09-06 LAB
ANION GAP SERPL CALC-SCNC: 4 MMOL/L — LOW (ref 5–17)
BUN SERPL-MCNC: 13 MG/DL — SIGNIFICANT CHANGE UP (ref 7–18)
CALCIUM SERPL-MCNC: 11.6 MG/DL — HIGH (ref 8.4–10.5)
CHLORIDE SERPL-SCNC: 104 MMOL/L — SIGNIFICANT CHANGE UP (ref 96–108)
CO2 SERPL-SCNC: 30 MMOL/L — SIGNIFICANT CHANGE UP (ref 22–31)
CREAT SERPL-MCNC: 0.91 MG/DL — SIGNIFICANT CHANGE UP (ref 0.5–1.3)
GLUCOSE BLDC GLUCOMTR-MCNC: 121 MG/DL — HIGH (ref 70–99)
GLUCOSE BLDC GLUCOMTR-MCNC: 133 MG/DL — HIGH (ref 70–99)
GLUCOSE SERPL-MCNC: 137 MG/DL — HIGH (ref 70–99)
HCT VFR BLD CALC: 40.4 % — SIGNIFICANT CHANGE UP (ref 34.5–45)
HGB BLD-MCNC: 13.1 G/DL — SIGNIFICANT CHANGE UP (ref 11.5–15.5)
MCHC RBC-ENTMCNC: 29.3 PG — SIGNIFICANT CHANGE UP (ref 27–34)
MCHC RBC-ENTMCNC: 32.4 GM/DL — SIGNIFICANT CHANGE UP (ref 32–36)
MCV RBC AUTO: 90.4 FL — SIGNIFICANT CHANGE UP (ref 80–100)
NRBC # BLD: 0 /100 WBCS — SIGNIFICANT CHANGE UP (ref 0–0)
PLATELET # BLD AUTO: 284 K/UL — SIGNIFICANT CHANGE UP (ref 150–400)
POTASSIUM SERPL-MCNC: 3.6 MMOL/L — SIGNIFICANT CHANGE UP (ref 3.5–5.3)
POTASSIUM SERPL-SCNC: 3.6 MMOL/L — SIGNIFICANT CHANGE UP (ref 3.5–5.3)
RBC # BLD: 4.47 M/UL — SIGNIFICANT CHANGE UP (ref 3.8–5.2)
RBC # FLD: 13.7 % — SIGNIFICANT CHANGE UP (ref 10.3–14.5)
SODIUM SERPL-SCNC: 138 MMOL/L — SIGNIFICANT CHANGE UP (ref 135–145)
T3FREE SERPL-MCNC: 2.43 PG/ML — SIGNIFICANT CHANGE UP (ref 1.8–4.6)
T4 FREE SERPL-MCNC: 1 NG/DL — SIGNIFICANT CHANGE UP (ref 0.9–1.8)
WBC # BLD: 7.08 K/UL — SIGNIFICANT CHANGE UP (ref 3.8–10.5)
WBC # FLD AUTO: 7.08 K/UL — SIGNIFICANT CHANGE UP (ref 3.8–10.5)

## 2019-09-06 PROCEDURE — 85610 PROTHROMBIN TIME: CPT

## 2019-09-06 PROCEDURE — 83735 ASSAY OF MAGNESIUM: CPT

## 2019-09-06 PROCEDURE — 86803 HEPATITIS C AB TEST: CPT

## 2019-09-06 PROCEDURE — 93306 TTE W/DOPPLER COMPLETE: CPT

## 2019-09-06 PROCEDURE — 80061 LIPID PANEL: CPT

## 2019-09-06 PROCEDURE — 93005 ELECTROCARDIOGRAM TRACING: CPT

## 2019-09-06 PROCEDURE — 83036 HEMOGLOBIN GLYCOSYLATED A1C: CPT

## 2019-09-06 PROCEDURE — A9502: CPT

## 2019-09-06 PROCEDURE — 85730 THROMBOPLASTIN TIME PARTIAL: CPT

## 2019-09-06 PROCEDURE — 80053 COMPREHEN METABOLIC PANEL: CPT

## 2019-09-06 PROCEDURE — 84481 FREE ASSAY (FT-3): CPT

## 2019-09-06 PROCEDURE — 71045 X-RAY EXAM CHEST 1 VIEW: CPT

## 2019-09-06 PROCEDURE — 82962 GLUCOSE BLOOD TEST: CPT

## 2019-09-06 PROCEDURE — 84439 ASSAY OF FREE THYROXINE: CPT

## 2019-09-06 PROCEDURE — 82746 ASSAY OF FOLIC ACID SERUM: CPT

## 2019-09-06 PROCEDURE — 93017 CV STRESS TEST TRACING ONLY: CPT

## 2019-09-06 PROCEDURE — 99285 EMERGENCY DEPT VISIT HI MDM: CPT | Mod: 25

## 2019-09-06 PROCEDURE — 82607 VITAMIN B-12: CPT

## 2019-09-06 PROCEDURE — 85027 COMPLETE CBC AUTOMATED: CPT

## 2019-09-06 PROCEDURE — 71275 CT ANGIOGRAPHY CHEST: CPT

## 2019-09-06 PROCEDURE — 36415 COLL VENOUS BLD VENIPUNCTURE: CPT

## 2019-09-06 PROCEDURE — 80048 BASIC METABOLIC PNL TOTAL CA: CPT

## 2019-09-06 PROCEDURE — 84484 ASSAY OF TROPONIN QUANT: CPT

## 2019-09-06 PROCEDURE — 82306 VITAMIN D 25 HYDROXY: CPT

## 2019-09-06 PROCEDURE — 83880 ASSAY OF NATRIURETIC PEPTIDE: CPT

## 2019-09-06 PROCEDURE — 78452 HT MUSCLE IMAGE SPECT MULT: CPT

## 2019-09-06 PROCEDURE — 82550 ASSAY OF CK (CPK): CPT

## 2019-09-06 PROCEDURE — 84443 ASSAY THYROID STIM HORMONE: CPT

## 2019-09-06 PROCEDURE — 84100 ASSAY OF PHOSPHORUS: CPT

## 2019-09-06 PROCEDURE — 82553 CREATINE MB FRACTION: CPT

## 2019-09-06 PROCEDURE — 94640 AIRWAY INHALATION TREATMENT: CPT

## 2019-09-06 RX ORDER — ACETAMINOPHEN 500 MG
2 TABLET ORAL
Qty: 40 | Refills: 0
Start: 2019-09-06 | End: 2019-09-10

## 2019-09-06 RX ORDER — ASPIRIN/CALCIUM CARB/MAGNESIUM 324 MG
1 TABLET ORAL
Qty: 30 | Refills: 0
Start: 2019-09-06 | End: 2019-10-05

## 2019-09-06 RX ORDER — VERAPAMIL HCL 240 MG
1 CAPSULE, EXTENDED RELEASE PELLETS 24 HR ORAL
Qty: 90 | Refills: 0
Start: 2019-09-06 | End: 2019-10-05

## 2019-09-06 RX ORDER — ATORVASTATIN CALCIUM 80 MG/1
1 TABLET, FILM COATED ORAL
Qty: 30 | Refills: 0
Start: 2019-09-06 | End: 2019-10-05

## 2019-09-06 RX ORDER — LISINOPRIL 2.5 MG/1
1 TABLET ORAL
Qty: 14 | Refills: 0
Start: 2019-09-06 | End: 2019-09-19

## 2019-09-06 RX ADMIN — LOSARTAN POTASSIUM 50 MILLIGRAM(S): 100 TABLET, FILM COATED ORAL at 06:07

## 2019-09-06 RX ADMIN — Medication 81 MILLIGRAM(S): at 12:22

## 2019-09-06 RX ADMIN — Medication 12.5 MILLIGRAM(S): at 06:07

## 2019-09-06 RX ADMIN — AMLODIPINE BESYLATE 10 MILLIGRAM(S): 2.5 TABLET ORAL at 06:07

## 2019-09-06 NOTE — DISCHARGE NOTE NURSING/CASE MANAGEMENT/SOCIAL WORK - PATIENT PORTAL LINK FT
You can access the FollowMyHealth Patient Portal offered by [facility] by registering at the following website: [email or web address]. By joining [employer]’s FollowMyHealth portal, you will also be able to view your health information using other applications (apps) compatible with our system.

## 2019-09-06 NOTE — PROGRESS NOTE ADULT - SUBJECTIVE AND OBJECTIVE BOX
Patient denies CP, SOB Review of systems otherwise (-)    acetaminophen   Tablet .. 650 milliGRAM(s) Oral every 6 hours PRN  ALBUTerol/ipratropium for Nebulization 3 milliLiter(s) Nebulizer every 8 hours  amLODIPine   Tablet 10 milliGRAM(s) Oral daily  aspirin enteric coated 81 milliGRAM(s) Oral daily  atorvastatin 40 milliGRAM(s) Oral at bedtime  dextrose 40% Gel 15 Gram(s) Oral once PRN  dextrose 5%. 1000 milliLiter(s) IV Continuous <Continuous>  dextrose 50% Injectable 12.5 Gram(s) IV Push once  dextrose 50% Injectable 25 Gram(s) IV Push once  dextrose 50% Injectable 25 Gram(s) IV Push once  enoxaparin Injectable 40 milliGRAM(s) SubCutaneous daily  glucagon  Injectable 1 milliGRAM(s) IntraMuscular once PRN  hydrochlorothiazide 12.5 milliGRAM(s) Oral daily  influenza   Vaccine 0.5 milliLiter(s) IntraMuscular once  insulin lispro (HumaLOG) corrective regimen sliding scale   SubCutaneous three times a day before meals  losartan 50 milliGRAM(s) Oral daily  naproxen 500 milliGRAM(s) Oral every 12 hours                            13.1   7.08  )-----------( 284      ( 06 Sep 2019 08:04 )             40.4       Hemoglobin: 13.1 g/dL (09-06 @ 08:04)  Hemoglobin: 11.8 g/dL (09-05 @ 07:26)  Hemoglobin: 11.3 g/dL (09-03 @ 04:44)  Hemoglobin: 11.2 g/dL (09-02 @ 21:22)      09-06    138  |  104  |  13  ----------------------------<  137<H>  3.6   |  30  |  0.91    Ca    11.6<H>      06 Sep 2019 08:04      Creatinine Trend: 0.91<--, 0.80<--, 0.88<--, 0.89<--    COAGS:           T(C): 36.5 (09-06-19 @ 05:26), Max: 36.8 (09-05-19 @ 18:37)  HR: 85 (09-06-19 @ 08:30) (66 - 92)  BP: 130/93 (09-06-19 @ 08:30) (104/72 - 146/95)  RR: 17 (09-06-19 @ 05:26) (16 - 18)  SpO2: 95% (09-06-19 @ 05:26) (95% - 99%)  Wt(kg): --    I&O's Summary      Gen: Appears well in NAD  HEENT:  (-)icterus (-)pallor  CV: N S1 S2 1/6 THERESA (+)2 Pulses B/l  Resp:  Clear to ausculatation B/L, normal effort  GI: (+) BS Soft, NT, ND  Lymph:  (-)Edema, (-)obvious lymphadenopathy  Skin: Warm to touch, Normal turgor  Psych: Appropriate mood and affect      TELEMETRY: 	  Sinus        ASSESSMENT/PLAN: 	58y  Female PMH of asthma, HTN, Left  Breast cancer on remission (s/p chemo and radiation on 2008), DM, Hypothyroidism who presented with left sided chest  pain and worsening shortness of breath since yesterday r/o for PE and MI.    - Echo with normal LV function  - Stress with no ischemia  - No need for further inpatient cardiac work up.      Joel Gruber MD, [facility]  BEEPER [phone]

## 2022-04-06 ENCOUNTER — EMERGENCY (EMERGENCY)
Facility: HOSPITAL | Age: 62
LOS: 1 days | Discharge: HOME | End: 2022-04-06
Attending: EMERGENCY MEDICINE | Admitting: EMERGENCY MEDICINE
Payer: COMMERCIAL

## 2022-04-06 VITALS
DIASTOLIC BLOOD PRESSURE: 103 MMHG | SYSTOLIC BLOOD PRESSURE: 162 MMHG | HEIGHT: 66 IN | RESPIRATION RATE: 18 BRPM | TEMPERATURE: 99 F | WEIGHT: 200.4 LBS | HEART RATE: 88 BPM | OXYGEN SATURATION: 99 %

## 2022-04-06 VITALS
SYSTOLIC BLOOD PRESSURE: 151 MMHG | HEART RATE: 80 BPM | DIASTOLIC BLOOD PRESSURE: 75 MMHG | OXYGEN SATURATION: 98 % | RESPIRATION RATE: 16 BRPM | TEMPERATURE: 98 F

## 2022-04-06 DIAGNOSIS — Z90.12 ACQUIRED ABSENCE OF LEFT BREAST AND NIPPLE: Chronic | ICD-10-CM

## 2022-04-06 LAB
ALBUMIN SERPL ELPH-MCNC: 3.9 G/DL — SIGNIFICANT CHANGE UP (ref 3.3–5)
ALP SERPL-CCNC: 118 U/L — SIGNIFICANT CHANGE UP (ref 30–120)
ALT FLD-CCNC: 32 U/L DA — SIGNIFICANT CHANGE UP (ref 10–60)
ANION GAP SERPL CALC-SCNC: 6 MMOL/L — SIGNIFICANT CHANGE UP (ref 5–17)
APPEARANCE UR: ABNORMAL
AST SERPL-CCNC: 21 U/L — SIGNIFICANT CHANGE UP (ref 10–40)
BASOPHILS # BLD AUTO: 0.03 K/UL — SIGNIFICANT CHANGE UP (ref 0–0.2)
BASOPHILS NFR BLD AUTO: 0.4 % — SIGNIFICANT CHANGE UP (ref 0–2)
BILIRUB SERPL-MCNC: 0.5 MG/DL — SIGNIFICANT CHANGE UP (ref 0.2–1.2)
BILIRUB UR-MCNC: NEGATIVE — SIGNIFICANT CHANGE UP
BUN SERPL-MCNC: 13 MG/DL — SIGNIFICANT CHANGE UP (ref 7–23)
CALCIUM SERPL-MCNC: 10.8 MG/DL — HIGH (ref 8.4–10.5)
CHLORIDE SERPL-SCNC: 104 MMOL/L — SIGNIFICANT CHANGE UP (ref 96–108)
CO2 SERPL-SCNC: 30 MMOL/L — SIGNIFICANT CHANGE UP (ref 22–31)
COLOR SPEC: YELLOW — SIGNIFICANT CHANGE UP
CREAT SERPL-MCNC: 0.87 MG/DL — SIGNIFICANT CHANGE UP (ref 0.5–1.3)
D DIMER BLD IA.RAPID-MCNC: 174 NG/ML DDU — SIGNIFICANT CHANGE UP
DIFF PNL FLD: ABNORMAL
EGFR: 75 ML/MIN/1.73M2 — SIGNIFICANT CHANGE UP
EOSINOPHIL # BLD AUTO: 0.14 K/UL — SIGNIFICANT CHANGE UP (ref 0–0.5)
EOSINOPHIL NFR BLD AUTO: 1.9 % — SIGNIFICANT CHANGE UP (ref 0–6)
FLUAV AG NPH QL: SIGNIFICANT CHANGE UP
FLUBV AG NPH QL: SIGNIFICANT CHANGE UP
GLUCOSE SERPL-MCNC: 142 MG/DL — HIGH (ref 70–99)
GLUCOSE UR QL: NEGATIVE MG/DL — SIGNIFICANT CHANGE UP
HCT VFR BLD CALC: 34.8 % — SIGNIFICANT CHANGE UP (ref 34.5–45)
HGB BLD-MCNC: 11.4 G/DL — LOW (ref 11.5–15.5)
IMM GRANULOCYTES NFR BLD AUTO: 0.3 % — SIGNIFICANT CHANGE UP (ref 0–1.5)
KETONES UR-MCNC: NEGATIVE — SIGNIFICANT CHANGE UP
LEUKOCYTE ESTERASE UR-ACNC: ABNORMAL
LYMPHOCYTES # BLD AUTO: 2.89 K/UL — SIGNIFICANT CHANGE UP (ref 1–3.3)
LYMPHOCYTES # BLD AUTO: 38.9 % — SIGNIFICANT CHANGE UP (ref 13–44)
MAGNESIUM SERPL-MCNC: 2 MG/DL — SIGNIFICANT CHANGE UP (ref 1.6–2.6)
MCHC RBC-ENTMCNC: 29.6 PG — SIGNIFICANT CHANGE UP (ref 27–34)
MCHC RBC-ENTMCNC: 32.8 GM/DL — SIGNIFICANT CHANGE UP (ref 32–36)
MCV RBC AUTO: 90.4 FL — SIGNIFICANT CHANGE UP (ref 80–100)
MONOCYTES # BLD AUTO: 0.65 K/UL — SIGNIFICANT CHANGE UP (ref 0–0.9)
MONOCYTES NFR BLD AUTO: 8.7 % — SIGNIFICANT CHANGE UP (ref 2–14)
NEUTROPHILS # BLD AUTO: 3.7 K/UL — SIGNIFICANT CHANGE UP (ref 1.8–7.4)
NEUTROPHILS NFR BLD AUTO: 49.8 % — SIGNIFICANT CHANGE UP (ref 43–77)
NITRITE UR-MCNC: NEGATIVE — SIGNIFICANT CHANGE UP
NRBC # BLD: 0 /100 WBCS — SIGNIFICANT CHANGE UP (ref 0–0)
NT-PROBNP SERPL-SCNC: 80 PG/ML — SIGNIFICANT CHANGE UP (ref 0–125)
PH UR: 7 — SIGNIFICANT CHANGE UP (ref 5–8)
PLATELET # BLD AUTO: 245 K/UL — SIGNIFICANT CHANGE UP (ref 150–400)
POTASSIUM SERPL-MCNC: 3.9 MMOL/L — SIGNIFICANT CHANGE UP (ref 3.5–5.3)
POTASSIUM SERPL-SCNC: 3.9 MMOL/L — SIGNIFICANT CHANGE UP (ref 3.5–5.3)
PROT SERPL-MCNC: 8.4 G/DL — HIGH (ref 6–8.3)
PROT UR-MCNC: NEGATIVE MG/DL — SIGNIFICANT CHANGE UP
RBC # BLD: 3.85 M/UL — SIGNIFICANT CHANGE UP (ref 3.8–5.2)
RBC # FLD: 13.5 % — SIGNIFICANT CHANGE UP (ref 10.3–14.5)
RSV RNA NPH QL NAA+NON-PROBE: SIGNIFICANT CHANGE UP
SARS-COV-2 RNA SPEC QL NAA+PROBE: SIGNIFICANT CHANGE UP
SODIUM SERPL-SCNC: 140 MMOL/L — SIGNIFICANT CHANGE UP (ref 135–145)
SP GR SPEC: 1.01 — SIGNIFICANT CHANGE UP (ref 1.01–1.02)
TROPONIN I, HIGH SENSITIVITY RESULT: 8.6 NG/L — SIGNIFICANT CHANGE UP
UROBILINOGEN FLD QL: NEGATIVE MG/DL — SIGNIFICANT CHANGE UP
WBC # BLD: 7.43 K/UL — SIGNIFICANT CHANGE UP (ref 3.8–10.5)
WBC # FLD AUTO: 7.43 K/UL — SIGNIFICANT CHANGE UP (ref 3.8–10.5)

## 2022-04-06 PROCEDURE — 71046 X-RAY EXAM CHEST 2 VIEWS: CPT

## 2022-04-06 PROCEDURE — 36415 COLL VENOUS BLD VENIPUNCTURE: CPT

## 2022-04-06 PROCEDURE — 71046 X-RAY EXAM CHEST 2 VIEWS: CPT | Mod: 26

## 2022-04-06 PROCEDURE — 84484 ASSAY OF TROPONIN QUANT: CPT

## 2022-04-06 PROCEDURE — 36000 PLACE NEEDLE IN VEIN: CPT

## 2022-04-06 PROCEDURE — 85379 FIBRIN DEGRADATION QUANT: CPT

## 2022-04-06 PROCEDURE — 99285 EMERGENCY DEPT VISIT HI MDM: CPT

## 2022-04-06 PROCEDURE — 83735 ASSAY OF MAGNESIUM: CPT

## 2022-04-06 PROCEDURE — 85025 COMPLETE CBC W/AUTO DIFF WBC: CPT

## 2022-04-06 PROCEDURE — 99285 EMERGENCY DEPT VISIT HI MDM: CPT | Mod: 25

## 2022-04-06 PROCEDURE — 81001 URINALYSIS AUTO W/SCOPE: CPT

## 2022-04-06 PROCEDURE — 80053 COMPREHEN METABOLIC PANEL: CPT

## 2022-04-06 PROCEDURE — 93010 ELECTROCARDIOGRAM REPORT: CPT

## 2022-04-06 PROCEDURE — 83880 ASSAY OF NATRIURETIC PEPTIDE: CPT

## 2022-04-06 PROCEDURE — 93005 ELECTROCARDIOGRAM TRACING: CPT

## 2022-04-06 PROCEDURE — 87637 SARSCOV2&INF A&B&RSV AMP PRB: CPT

## 2022-04-06 RX ORDER — VERAPAMIL HCL 240 MG
240 CAPSULE, EXTENDED RELEASE PELLETS 24 HR ORAL ONCE
Refills: 0 | Status: COMPLETED | OUTPATIENT
Start: 2022-04-06 | End: 2022-04-06

## 2022-04-06 RX ORDER — METFORMIN HYDROCHLORIDE 850 MG/1
500 TABLET ORAL ONCE
Refills: 0 | Status: COMPLETED | OUTPATIENT
Start: 2022-04-06 | End: 2022-04-06

## 2022-04-06 RX ORDER — SODIUM CHLORIDE 9 MG/ML
500 INJECTION INTRAMUSCULAR; INTRAVENOUS; SUBCUTANEOUS ONCE
Refills: 0 | Status: COMPLETED | OUTPATIENT
Start: 2022-04-06 | End: 2022-04-06

## 2022-04-06 RX ADMIN — Medication 240 MILLIGRAM(S): at 21:11

## 2022-04-06 RX ADMIN — METFORMIN HYDROCHLORIDE 500 MILLIGRAM(S): 850 TABLET ORAL at 21:11

## 2022-04-06 RX ADMIN — SODIUM CHLORIDE 500 MILLILITER(S): 9 INJECTION INTRAMUSCULAR; INTRAVENOUS; SUBCUTANEOUS at 21:10

## 2022-04-06 NOTE — ED PROVIDER NOTE - NSICDXPASTMEDICALHX_GEN_ALL_CORE_FT
PAST MEDICAL HISTORY:  Asthma     Breast CA     DM (diabetes mellitus)     HTN (hypertension)

## 2022-04-06 NOTE — ED PROVIDER NOTE - PROVIDER TOKENS
FREE:[LAST:[Your pulmonologist],PHONE:[(   )    -],FAX:[(   )    -],FOLLOWUP:[4-6 Days]],FREE:[LAST:[Your primary care doctor],PHONE:[(   )    -],FAX:[(   )    -],FOLLOWUP:[1-3 Days]],FREE:[LAST:[Your cardiologist],PHONE:[(   )    -],FAX:[(   )    -],FOLLOWUP:[1-3 Days]]

## 2022-04-06 NOTE — ED ADULT NURSE NOTE - OBJECTIVE STATEMENT
pt reporting having some SOB that started today; also c/o palpitations. denies chest pain presently. reports her blood pressure was high at home

## 2022-04-06 NOTE — ED PROVIDER NOTE - PROGRESS NOTE DETAILS
discussed results, will provide copy, pt states she has appt with pcp tomorrow and pulmonologist in 6d

## 2022-04-06 NOTE — ED PROVIDER NOTE - CLINICAL SUMMARY MEDICAL DECISION MAKING FREE TEXT BOX
61 y.o. F c/o sob, palpitations, chest pain, some symptoms more subacute, some x1d, will check cardiac enzymes, ekg, cxr, dimer, elytes, give night meds and reassess

## 2022-04-06 NOTE — ED ADULT NURSE NOTE - ALCOHOL PRE SCREEN (AUDIT - C)
How Severe Is Your Skin Lesion?: mild Has Your Skin Lesion Been Treated?: not been treated Is This A New Presentation, Or A Follow-Up?: Skin Lesion Statement Selected

## 2022-04-06 NOTE — ED PROVIDER NOTE - CARE PROVIDER_API CALL
Your pulmonologist,   Phone: (   )    -  Fax: (   )    -  Follow Up Time: 4-6 Days    Your primary care doctor,   Phone: (   )    -  Fax: (   )    -  Follow Up Time: 1-3 Days    Your cardiologist,   Phone: (   )    -  Fax: (   )    -  Follow Up Time: 1-3 Days

## 2022-04-06 NOTE — ED ADULT NURSE NOTE - ED CARDIAC RHYTHM
[facility]  Anesthesia Pre-Operative Evaluation         Patient Name: Sarina Genao  YOB: 1950  MRN: [id number]    SUBJECTIVE:     Pre-operative evaluation for Procedure(s) (LRB):  BRONCHOSCOPY, NAVIGATIONAL (N/A)     12/10/2018    Sarina Genao is a 68 y.o. female w/ a significant PMHx of HTN, uncontrolled T2DM (A1c 9.9), HLD, PVD, GERD, osteoporosis, CKD3, COPD. CT shows abnormal left hilar lymph node measuring 1.8 x 1.6 cm (axial series 2, image 47) unchanged in size since prior exam.    Patient now presents for the above procedure(s).      Prev airway: 8/6/2013  DL mask vent easy - oral, betancur 2, grade I view, no complications, 1 attempt      Patient Active Problem List   Diagnosis    Combined hyperlipidemia associated with type 2 diabetes mellitus    Hypertension, benign    Urinary incontinence, urge    History of kidney cancer    Family history of stomach cancer    GERD (gastroesophageal reflux disease)    Osteoporosis, post-menopausal    Tobacco dependence    COPD (chronic obstructive pulmonary disease)    CKD (chronic kidney disease) stage 3, GFR 30-59 ml/min    Nuclear sclerosis of both eyes    DM type 2 without retinopathy    Essential hypertension    Refractive error    Senile nuclear sclerosis    Post-operative state    Nuclear sclerosis of right eye    Primary hyperparathyroidism    Diabetes mellitus with renal manifestations, uncontrolled    History of colonic polyps    Schatzki's ring    Pseudophakia    PVD (peripheral vascular disease) with claudication    PVD (peripheral vascular disease)    Bilateral carotid artery stenosis    PCO (posterior capsular opacification), right    Pulmonary vascular congestion    Acute respiratory failure with hypoxia    CAP (community acquired pneumonia)    Type 2 diabetes mellitus  with kidney complication, with long-term current use of insulin    Type 2 diabetes mellitus with hyperglycemia, with long-term current use of insulin    COPD exacerbation    Chest pain on breathing       Review of patient's allergies indicates:   Allergen Reactions    Metformin Diarrhea    Pantoprazole      elevated blood sugars       Current Inpatient Medications:      No current facility-administered medications on file prior to encounter.      Current Outpatient Medications on File Prior to Encounter   Medication Sig Dispense Refill    albuterol (PROVENTIL/VENTOLIN HFA) 90 mcg/actuation inhaler Inhale 1-2 puffs into the lungs every 6 (six) hours as needed for Wheezing. Rescue 1 Inhaler 3    albuterol-ipratropium (DUO-NEB) 2.5 mg-0.5 mg/3 mL nebulizer solution Take 3 mLs by nebulization every 6 (six) hours as needed for Wheezing or Shortness of Breath. Rescue 3 Box 12    amLODIPine (NORVASC) 5 MG tablet Take 1 tablet (5 mg total) by mouth once daily. 90 tablet 3    aspirin (ECOTRIN) 81 MG EC tablet Take 81 mg by mouth once daily.      calcium carbonate (OS-LISSETTE) 500 mg calcium (1,250 mg) tablet Take 1 tablet (500 mg total) by mouth once daily. 30 tablet 5    cyanocobalamin, vitamin B-12, (VITAMIN B-12) 1,000 mcg TbSR Take by mouth once daily.       DOCOSAHEXANOIC ACID/EPA (FISH OIL ORAL) Take 1,200 mg by mouth once daily.      furosemide (LASIX) 20 MG tablet Take 1 tablet (20 mg total) by mouth once daily. 30 tablet 11    hydroCHLOROthiazide (HYDRODIURIL) 12.5 MG Tab Take 1 tablet (12.5 mg total) by mouth once daily. 90 tablet 3    insulin aspart U-100 (NOVOLOG FLEXPEN U-100 INSULIN) 100 unit/mL InPn pen ADMINISTER 12 UNITS UNDER THE SKIN THREE TIMES DAILY WITH MEALS (Patient taking differently: 10 Units 3 (three) times daily with meals. ADMINISTER 12 UNITS UNDER THE SKIN THREE TIMES DAILY WITH MEALS) 45 mL 12    insulin degludec (TRESIBA FLEXTOUCH U-200) 200 unit/mL (3 mL) InPn Inject 55 Units into  "the skin once daily. 6 mL 8    insulin detemir U-100 (LEVEMIR FLEXTOUCH) 100 unit/mL (3 mL) SubQ InPn pen Inject 50 Units into the skin every evening. 100 mL 12    irbesartan (AVAPRO) 150 MG tablet Take 1 tablet (150 mg total) by mouth once daily. 90 tablet 3    oxybutynin (DITROPAN-XL) 5 MG TR24 TAKE 1 TABLET(5 MG) BY MOUTH EVERY DAY 90 tablet 3    pen needle, diabetic (BD INSULIN PEN NEEDLE UF SHORT) 31 gauge x 5/16" Ndle USE AS DIRECTED 100 each 12    ranitidine (ZANTAC) 300 MG tablet TAKE 1 TABLET(300 MG) BY MOUTH EVERY EVENING 90 tablet 0    rosuvastatin (CRESTOR) 20 MG tablet Take 1 tablet (20 mg total) by mouth once daily. 90 tablet 3    semaglutide (OZEMPIC) 0.25 mg or 0.5 mg(2 mg/1.5 mL) PnIj Inject 0.5 mg into the skin once a week. 3 mL 9    traMADol (ULTRAM) 50 mg tablet Take 1 tablet (50 mg total) by mouth every 6 (six) hours as needed. 60 tablet 3    umeclidinium-vilanterol (ANORO ELLIPTA) 62.5-25 mcg/actuation DsDv Inhale 1 puff into the lungs once daily. THIS SHOULD BE USED, NOT INCRUSE 1 each 3       Past Surgical History:   Procedure Laterality Date    bladder lift  2011    [facility]    BLADDER STONE REMOVAL  2011    before bladder lift    CATARACT EXTRACTION W/  INTRAOCULAR LENS IMPLANT Left 06/07/2016    Dr. Vásquez    CATARACT EXTRACTION W/  INTRAOCULAR LENS IMPLANT Right 06/21/2016    Dr. Vásquez    COLONOSCOPY N/A 4/26/2018    Procedure: COLONOSCOPY;  Surgeon: Benjamin Zamora MD;  Location: [facility];  Service: Endoscopy;  Laterality: N/A;  confirmed ss    COLONOSCOPY N/A 4/26/2018    Performed by Benjamin [facility] at [facility] ENDO    COLONOSCOPY N/A 7/12/2013    Performed by Mariposa Shah MD at [facility] ENDO    CYSTOSCOPY      EGD (ESOPHAGOGASTRODUODENOSCOPY) N/A 1/8/2014    Performed by Isaias Marinelli MD at [facility] ENDO    INSERTION-INTRAOCULAR LENS (IOL) Right 6/21/2016    [facility] at [facility] 1ST FLR    INSERTION-INTRAOCULAR " LENS (IOL) Left 6/7/2016    Performed by [facility] at [facility] OR 1ST FLR    NEPHRECTOMY      partial right    NEPHRECTOMY, RADICAL Right 8/6/2013    Performed by Santos Murry MD at [facility] OR 2ND FLR    PHACOEMULSIFICATION-ASPIRATION-CATARACT Right 6/21/2016    Performed by [facility]quez [facility] at [facility] OR 1ST FLR    PHACOEMULSIFICATION-ASPIRATION-CATARACT Left 6/7/2016    Performed by Xavier [facility]quez MD at [facility] OR 1ST FLR    ROBOTIC NEPHRECTOMY, PARTIAL Right 8/6/2013    Performed by Santos Murry MD at [facility] OR 2ND FLR       Social History     Socioeconomic History    Marital status:      Spouse name: Not on file    Number of children: Not on file    Years of education: Not on file    Highest education level: Not on file   Social Needs    Financial resource strain: Not on file    Food insecurity - worry: Not on file    Food insecurity - inability: Not on file    Transportation needs - medical: Not on file    Transportation needs - non-medical: Not on file   Occupational History    Occupation: retired x ray tech   Tobacco Use    Smoking status: Former Smoker     Packs/day: 0.25     Years: 30.00     Pack years: 7.50     Types: Cigarettes    Smokeless tobacco: Never Used    Tobacco comment: pt. reports quitting January 2018   Substance and Sexual Activity    Alcohol use: No     Alcohol/week: 0.0 oz    Drug use: No    Sexual activity: Not on file   Other Topics Concern    Not on file   Social History Narrative    Lives on [facility] [address]    Here with sister Kate [phone]           OBJECTIVE:     Vital Signs Range (Last 24H):         Significant Labs:  Lab Results   Component Value Date    WBC 10.29 10/15/2018    HGB 11.4 (L) 10/15/2018    HCT 35.6 (L) 10/15/2018     (H) 10/15/2018    CHOL 167 03/23/2018    TRIG 149 03/23/2018    HDL 53 03/23/2018    ALT 15 11/05/2018    AST 20 11/05/2018     11/05/2018    K 3.7 11/05/2018     11/05/2018     CREATININE 1.1 11/05/2018    BUN 10 11/05/2018    CO2 29 11/05/2018    TSH 1.792 03/23/2018    HGBA1C 9.9 (H) 10/12/2018       Diagnostic Studies: No relevant studies.    EKG:   Vent. Rate : 105 BPM     Atrial Rate : 105 BPM     P-R Int : 164 ms          QRS Dur : 094 ms      QT Int : 354 ms       P-R-T Axes : 053 037 057 degrees     QTc Int : 467 ms    Sinus tachycardia  Possible Anterior infarct (cited on or before 08-OCT-2018)  Abnormal ECG  When compared with ECG of 15-AUG-2017 10:06,  Significant changes have occurred  Confirmed by Israel Judd MD ([id number]) on 10/8/2018 8:09:16 PM    Referred By: LATRICE MCKEON           Confirmed By:Israel Judd MD    2D ECHO:  Results for orders placed or performed during the hospital encounter of 10/12/18   2D echo with color flow doppler   Result Value Ref Range    QEF 72 55 - 65    Diastolic Dysfunction Yes (A)      CONCLUSIONS     1 - Hyperdynamic left ventricular systolic function (EF >70%).     2 - Impaired LV relaxation, normal LAP (grade 1 diastolic dysfunction).     3 - Normal right ventricular systolic function .     4 - Low central venous pressure.       ASSESSMENT/PLAN:         Anesthesia Evaluation    I have reviewed the Patient Summary Reports.    I have reviewed the Nursing Notes.   I have reviewed the Medications.     Review of Systems  Anesthesia Hx:  History of prior surgery of interest to airway management or planning:   Cardiovascular:   Hypertension CHF PVD hyperlipidemia    Pulmonary:   COPD    Renal/:   Chronic Renal Disease, CRI    Hepatic/GI:   GERD    Neurological:  Neurology Normal    Endocrine:   Diabetes, poorly controlled, type 2        Physical Exam  General:  Well nourished    Airway/Jaw/Neck:  Airway Findings: Mouth Opening: Normal Tongue: Normal  General Airway Assessment: Adult  Mallampati: II  Improves to II with phonation.  TM Distance: Normal, at least 6 cm      Dental:  Dental Findings: In tact   Chest/Lungs:  Chest/Lungs Findings:  Clear to auscultation     Heart/Vascular:  Heart Findings: Rate: Normal  Rhythm: Regular Rhythm  Sounds: Normal        Mental Status:  Mental Status Findings:  Cooperative, Alert and Oriented         Anesthesia Plan  Type of Anesthesia, risks & benefits discussed:  Anesthesia Type:  general  Patient's Preference: General  Intra-op Monitoring Plan: standard ASA monitors  Intra-op Monitoring Plan Comments: Standard ASA monitors.   Post Op Pain Control Plan: multimodal analgesia, IV/PO Opioids PRN and per primary service following discharge from PACU  Post Op Pain Control Plan Comments: Per primary service.     Induction:   IV  Beta Blocker:  Patient is not currently on a Beta-Blocker (No further documentation required).       Informed Consent: Patient understands risks and agrees with Anesthesia plan.  Questions answered. Anesthesia consent signed with patient.  ASA Score: 3     Day of Surgery Review of History & Physical:    H&P update referred to the surgeon.     Anesthesia Plan Notes: Chart reviewed, patient interviewed and examined.  The plan for general anesthesia was explained.  Questions were answered and the consent was signed.  Annalias PARMAR         Ready For Surgery From Anesthesia Perspective.        regular

## 2022-04-06 NOTE — ED PROVIDER NOTE - OBJECTIVE STATEMENT
62 y/o female with a PMHx of DM, HTN presents to the ED c/o intermittent sharp CP, palpitations, SOB, elevated BP since approx. 1 pm after drinking a cup of tea with honey and bilateral knee pain & swelling. Pt reports her sugar spiked to 220 after drinking tea and then she subsequently developed palpitations and SOB. Pt also checked her BP and it was high. Pt states it takes a lot time for her sugar to come down and it was 150 last time she checked. Denies fevers, chills, runny nose, sore throat, and any other symptoms. Denies EtOH use.  PCP: Dr. Stevens & Cardiology: Dr. Mendelson at [facility]

## 2022-04-06 NOTE — ED ADULT NURSE REASSESSMENT NOTE - NS ED NURSE REASSESS COMMENT FT1
MD aware of all results. pt pain free at this time. NAD. d/c to self. verbalized understanding of d/c instructions. ambulated unassisted with all belongings

## 2022-04-06 NOTE — ED ADULT NURSE NOTE - NS ED NURSE LEVEL OF CONSCIOUSNESS ORIENTATION
For information on Fall & Injury Prevention, visit [email or web address]
Oriented - self; Oriented - place; Oriented - time

## 2022-04-06 NOTE — ED PROVIDER NOTE - PATIENT PORTAL LINK FT
You can access the FollowMyHealth Patient Portal offered by [facility] by registering at the following website: [email or web address]. By joining [employer]’s FollowMyHealth portal, you will also be able to view your health information using other applications (apps) compatible with our system.

## 2022-04-06 NOTE — ED ADULT TRIAGE NOTE - WILL THE PATIENT ACCEPT THE PFIZER COVID-19 VACCINE IF ELIGIBLE AND IT IS AVAILABLE?
"[facility] Surgery Consultation    CC:  Right bloody nipple discharge    HPI:  This 61 year old year old female is seen at the request of Vannessa Anderson NP for evaluation of bloody nipple discharge.   The patient noted the onset of bloody nipple discharge approximately one month ago.  This was spontaneous, culture negative and evaluated with imaging.  Ultrasound was performed and an abnormality was identified, biopsied and benign fibrocystic change noted.     RISK FACTORS FOR BREAST NEOPLASIA:  Her menarche was at age 10 and menopause at age 40.  She was on estrogen replacement for 5 years.  She has 6 children, the eldest age 41 and breast fed all.  There is no known history of breast, ovarian, colon or pancreatic malignancy in her family.    Past Medical History:   Diagnosis Date     Alopecia      Dermatitis      Hyperlipidemia LDL goal < 100      Tobacco abuse 06/27/2011     Vitamin D deficiency 5/7/2013     Past Surgical History:   Procedure Laterality Date     BIOPSY Right 06/15/2017    US guided right breast needle biopsy     CHOLECYSTECTOMY  1975    Cholelithiasis     COLONOSCOPY N/A 1/12/2015    Procedure: COLONOSCOPY;  Surgeon: Tianna Hanley MD;  Location: [facility] OR     facial redisection gland removal      LEFT > infected neck gland     hysterectomy./partial  1981     Current Outpatient Prescriptions   Medication     triamcinolone (KENALOG) 0.1 % ointment     aspirin 81 MG tablet     Ascorbic Acid (VITAMIN C PO)     Omega-3 Fatty Acids (OMEGA-3 FISH OIL PO)     KRILL OIL PO     Cholecalciferol (VITAMIN D) 2000 UNITS tablet     No current facility-administered medications for this visit.      Allergies   Allergen Reactions     Penicillins Hives     HABITS:    Social History   Substance Use Topics     Smoking status: Current Every Day Smoker     Packs/day: 1.00     Years: 25.00     Types: Cigarettes     Smokeless tobacco: Never Used      Comment: Tried to Quit (YES); Longest Tobacco Free (\"Cutdown\")     " "Alcohol use Yes      Comment: OCCASIONALLY       Family History   Problem Relation Age of Onset     CANCER Brother      Liver     Colon Cancer Brother      CANCER Brother      Pancreatic     CANCER Other      Family Hx     DIABETES Father      CANCER Father      Stomach     HEART DISEASE Father      Heart Disease     HEART DISEASE Mother      Heart Disease     CANCER Mother 73     Liver     CANCER Sister 57     Pancreatic - Cause of Death       REVIEW OF SYSTEMS:  Ten point review of systems negative except those mentioned in the HPI.     OBJECTIVE:    /68 (BP Location: Right arm, Patient Position: Chair, Cuff Size: Adult Regular)  Pulse 75  Temp 97.9  F (36.6  C) (Tympanic)  Resp 20  Ht 5' 1\" (1.549 m)  Wt 122 lb (55.3 kg)  SpO2 94%  BMI 23.05 kg/m2    GENERAL: Generally appears well, in no distress with appropriate affect.  HEENT:   Sclerae anicteric - No cervical, supra/infraclavciular lymphadenopathy, no thyroid masses  Respiratory:  Lungs clear to ausculation bilaterally with good air excursion  Cardiovascular:  Regular Rate and Rhythm with no murmurs gallops or rubs, normal   Breast Examination:  In the seated position with the arms elevated, there is ecchymosis at the 7:00 position from biopsy.  There is no contour distortion, skin change or nipple abnormality.    In the supine position, the right breast shows a tender, firm region at 7:00 position at the areolar border extending laterally one cm.  The remainder of the right breast shows a homogeneous parenchyma.  Focused examination in all quadrants shows no suggestion of mass, density, region of tenderness or palpable abnormality.  The subareolar region, axillary tail and right axilla are similarly without finding.    The left breast shows a similar homogeneous parenchyma devoid of abnormality.  There is no palpable correlate in the region of mammographic suspicion.  Focused examination in all quadrants shows no region of discrete or suspicious " mass, asymmetric density or tenderness.  The subareolar region, axillary tail and left axilla are without finding.  A skin tag is present at the medial nipple region and excision requested.    :  deferred  Extremities:  Extremities normal. No deformities, edema, or skin discoloration.  Skin:  no suspicious lesions or rashes  Neurological: grossly intact    Psych:  Alert, oriented, affect appropriate with normal decision making ability.    IMPRESSION:  Bloody nipple discharge, dilated ducts with guided biopsy showing benign fibrocystic change which is not concordant.  Excision is indicated.  Pathologic entities described including ductal ectasia, papilloma, malignancy and questions were asked and answered.   Procedure, risks, benefits and approach discussed at length and she voices understanding.    PLAN:  Scheduled Monday, July 17, 2017.  Pre-op with KASEY Quick MD, FACS    6/26/2017  3:19 PM    cc:  Vannessa Anderson NP   Not applicable

## 2022-10-10 NOTE — ED ADULT NURSE NOTE - NEURO WDL
Alert and oriented to person, place and time
Alert and oriented to person, place and time, memory intact, behavior appropriate to situation, PERRL.

## 2022-11-18 ENCOUNTER — EMERGENCY (EMERGENCY)
Facility: HOSPITAL | Age: 62
LOS: 1 days | Discharge: HOME | End: 2022-11-18
Attending: EMERGENCY MEDICINE | Admitting: EMERGENCY MEDICINE
Payer: COMMERCIAL

## 2022-11-18 VITALS
SYSTOLIC BLOOD PRESSURE: 160 MMHG | OXYGEN SATURATION: 99 % | HEIGHT: 66 IN | DIASTOLIC BLOOD PRESSURE: 100 MMHG | RESPIRATION RATE: 18 BRPM | WEIGHT: 192.02 LBS | HEART RATE: 82 BPM | TEMPERATURE: 98 F

## 2022-11-18 VITALS
RESPIRATION RATE: 17 BRPM | OXYGEN SATURATION: 98 % | DIASTOLIC BLOOD PRESSURE: 84 MMHG | HEART RATE: 67 BPM | TEMPERATURE: 98 F | SYSTOLIC BLOOD PRESSURE: 138 MMHG

## 2022-11-18 DIAGNOSIS — Z90.12 ACQUIRED ABSENCE OF LEFT BREAST AND NIPPLE: Chronic | ICD-10-CM

## 2022-11-18 PROCEDURE — 99284 EMERGENCY DEPT VISIT MOD MDM: CPT

## 2022-11-18 PROCEDURE — 93970 EXTREMITY STUDY: CPT | Mod: 26

## 2022-11-18 PROCEDURE — 93970 EXTREMITY STUDY: CPT

## 2022-11-18 PROCEDURE — 99284 EMERGENCY DEPT VISIT MOD MDM: CPT | Mod: 25

## 2022-11-18 RX ORDER — ROSUVASTATIN CALCIUM 5 MG/1
1 TABLET ORAL
Qty: 0 | Refills: 0 | DISCHARGE

## 2022-11-18 RX ORDER — FLUTICASONE FUROATE, UMECLIDINIUM BROMIDE AND VILANTEROL TRIFENATATE 200; 62.5; 25 UG/1; UG/1; UG/1
1 POWDER RESPIRATORY (INHALATION)
Qty: 0 | Refills: 0 | DISCHARGE

## 2022-11-18 RX ORDER — ACETAMINOPHEN 500 MG
650 TABLET ORAL ONCE
Refills: 0 | Status: COMPLETED | OUTPATIENT
Start: 2022-11-18 | End: 2022-11-18

## 2022-11-18 RX ORDER — VERAPAMIL HCL 240 MG
0 CAPSULE, EXTENDED RELEASE PELLETS 24 HR ORAL
Qty: 0 | Refills: 0 | DISCHARGE

## 2022-11-18 RX ADMIN — Medication 650 MILLIGRAM(S): at 11:43

## 2022-11-18 NOTE — ED PROVIDER NOTE - PATIENT PORTAL LINK FT
You can access the FollowMyHealth Patient Portal offered by [facility] by registering at the following website: [email or web address]. By joining "[employer]’s FollowMyHealth portal, you will also be able to view your health information using other applications (apps) compatible with our system.

## 2022-11-18 NOTE — ED PROVIDER NOTE - OBJECTIVE STATEMENT
62-year-old female with history of hypertension, diabetes, history of breast CA status post lumpectomy presents with complaint of left posterior knee pain since last night.  States that she noted some cramps to her right calf yesterday but none today however has had frequent pain to her left posterior knee since last night.  Denies fever, shortness of breath, chest pain, recent travel, history of DVT/PE, numbness, tingling, trauma or other symptoms. 62-year-old female with history of hypertension, diabetes, history of breast CA status post lumpectomy, asthma presents with complaint of left posterior knee pain since last night.  States that she noted some cramps to her right calf yesterday but none today however has had frequent pain to her left posterior knee since last night.  Denies fever, shortness of breath, chest pain, recent travel, history of DVT/PE, numbness, tingling, trauma or other symptoms. 62-year-old female with history of hypertension, diabetes, history of breast CA status post lumpectomy, asthma presents with complaint of left posterior knee pain since last night.  States that she noted some cramps to her right calf yesterday but none today however has had frequent pain to her left posterior knee since last night.  Denies fever, shortness of breath, chest pain, recent travel, history of DVT/PE, numbness, tingling, trauma/fall or other symptoms.

## 2022-11-18 NOTE — ED PROVIDER NOTE - PROVIDER TOKENS
PROVIDER:[TOKEN:[[id number]],FOLLOWUP:[1-3 Days]],FREE:[LAST:[YOUR VASCULAR SURGEON],PHONE:[(   )    -],FAX:[(   )    -],FOLLOWUP:[1-3 Days]]

## 2022-11-18 NOTE — ED PROVIDER NOTE - CARE PROVIDER_API CALL
Angel Ellington)  Orthopaedic Sports Medicine; Orthopaedic Surgery  [address], [address]  [place] [address]  Phone: [phone]  Fax: [phone]  Follow Up Time: 1-3 Days    YOUR VASCULAR SURGEON,   Phone: (   )    -  Fax: (   )    -  Follow Up Time: 1-3 Days

## 2022-11-18 NOTE — ED PROVIDER NOTE - NSFOLLOWUPINSTRUCTIONS_ED_ALL_ED_FT
Follow up with Orthopedist and your vascular surgeon for re-evaluation, ongoing care and treatment. Rest, elevate knee, weight bearing as tolerated, take tylenol as needed for pain. If having worsening of symptoms or other related symptoms, RETURN TO THE ER IMMEDIATELY.     Acute Knee Pain, Adult      Many things can cause knee pain. Sometimes, knee pain is sudden (acute) and may be caused by damage, swelling, or irritation of the muscles and tissues that support your knee.    The pain often goes away on its own with time and rest. If the pain does not go away, tests may be done to find out what is causing the pain.      Follow these instructions at home:      If you have a knee sleeve or brace:      •Wear the knee sleeve or brace as told by your doctor. Take it off only as told by your doctor.    •Loosen it if your toes:  •Tingle.      •Become numb.      •Turn cold and blue.        •Keep it clean.    •If the knee sleeve or brace is not waterproof:  •Do not let it get wet.      •Cover it with a watertight covering when you take a bath or shower.        Activity     •Rest your knee.      • Do not do things that cause pain or make pain worse.      •Avoid activities where both feet leave the ground at the same time (high-impact activities). Examples are running, jumping rope, and doing jumping jacks.      •Work with a physical therapist to make a safe exercise program, as told by your doctor.        Managing pain, stiffness, and swelling    •If told, put ice on the knee. To do this:  •If you have a removable knee sleeve or brace, take it off as told by your doctor.      •Put ice in a plastic bag.      •Place a towel between your skin and the bag.      •Leave the ice on for 20 minutes, 2–3 times a day.      •Take off the ice if your skin turns bright red. This is very important. If you cannot feel pain, heat, or cold, you have a greater risk of damage to the area.        •If told, use an elastic bandage to put pressure (compression) on your injured knee.      •Raise your knee above the level of your heart while you are sitting or lying down.      •Sleep with a pillow under your knee.      General instructions     •Take over-the-counter and prescription medicines only as told by your doctor.      • Do not smoke or use any products that contain nicotine or tobacco. If you need help quitting, ask your doctor.      •If you are overweight, work with your doctor and a food expert (dietitian) to set goals to lose weight. Being overweight can make your knee hurt more.      •Watch for any changes in your symptoms.      •Keep all follow-up visits.        Contact a doctor if:    •The knee pain does not stop.      •The knee pain changes or gets worse.      •You have a fever along with knee pain.      •Your knee is red or feels warm when you touch it.      •Your knee gives out or locks up.        Get help right away if:    •Your knee swells, and the swelling gets worse.      •You cannot move your knee.      •You have very bad knee pain that does not get better with pain medicine.        Summary    •Many things can cause knee pain. The pain often goes away on its own with time and rest.      •Your doctor may do tests to find out the cause of the pain.      •Watch for any changes in your symptoms. Relieve your pain with rest, medicines, light activity, and use of ice.      •Get help right away if you cannot move your knee or your knee pain is very bad.      This information is not intended to replace advice given to you by your health care provider. Make sure you discuss any questions you have with your health care provider.

## 2022-11-18 NOTE — ED PROVIDER NOTE - MUSCULOSKELETAL, MLM
+ttp left posterior knee with mild swelling noted, L knee with FROM, no erythema or increased warmth noted, +varicose veins noted to BLE, B calf NT, toes warm & mobile, distal pulses and sensation intact, NVI

## 2022-11-18 NOTE — ED PROVIDER NOTE - CLINICAL SUMMARY MEDICAL DECISION MAKING FREE TEXT BOX
Patient complaining of posterior left knee pain since last night.  Patient relates had mild right calf pain yesterday none today.  Patient denies fevers chills trauma chest pain short of breath abdominal pain vomiting edema travel.  No history of PE or DVT.  Plan lower extremity Dopplers and Tylenol differential including but not limited to DVT Baker's cyst muscle sprain

## 2022-11-18 NOTE — ED PROVIDER NOTE - CARE PROVIDERS DIRECT ADDRESSES
[email or web address]

## 2022-11-18 NOTE — ED ADULT NURSE NOTE - OBJECTIVE STATEMENT
61 yo patient complaining of posterior left knee pain since last night.  Patient relates had mild right calf pain yesterday none today.  Patient denies fevers chills trauma chest pain short of breath abdominal pain vomiting edema travel.  No history of PE or DVT.

## 2023-03-02 ENCOUNTER — EMERGENCY (EMERGENCY)
Facility: HOSPITAL | Age: 63
LOS: 0 days | Discharge: HOME | End: 2023-03-03
Attending: STUDENT IN AN ORGANIZED HEALTH CARE EDUCATION/TRAINING PROGRAM
Payer: MEDICARE

## 2023-03-02 VITALS
OXYGEN SATURATION: 99 % | WEIGHT: 194.01 LBS | RESPIRATION RATE: 18 BRPM | HEIGHT: 66 IN | HEART RATE: 75 BPM | DIASTOLIC BLOOD PRESSURE: 95 MMHG | TEMPERATURE: 98 F | SYSTOLIC BLOOD PRESSURE: 171 MMHG

## 2023-03-02 VITALS
HEART RATE: 80 BPM | TEMPERATURE: 98 F | SYSTOLIC BLOOD PRESSURE: 125 MMHG | RESPIRATION RATE: 19 BRPM | DIASTOLIC BLOOD PRESSURE: 77 MMHG | OXYGEN SATURATION: 99 %

## 2023-03-02 DIAGNOSIS — E11.9 TYPE 2 DIABETES MELLITUS WITHOUT COMPLICATIONS: ICD-10-CM

## 2023-03-02 DIAGNOSIS — R00.2 PALPITATIONS: ICD-10-CM

## 2023-03-02 DIAGNOSIS — Z88.8 ALLERGY STATUS TO OTHER DRUGS, MEDICAMENTS AND BIOLOGICAL SUBSTANCES: ICD-10-CM

## 2023-03-02 DIAGNOSIS — Z79.84 LONG TERM (CURRENT) USE OF ORAL HYPOGLYCEMIC DRUGS: ICD-10-CM

## 2023-03-02 DIAGNOSIS — I10 ESSENTIAL (PRIMARY) HYPERTENSION: ICD-10-CM

## 2023-03-02 DIAGNOSIS — Z91.013 ALLERGY TO SEAFOOD: ICD-10-CM

## 2023-03-02 DIAGNOSIS — U07.1 COVID-19: ICD-10-CM

## 2023-03-02 DIAGNOSIS — Z95.818 PRESENCE OF OTHER CARDIAC IMPLANTS AND GRAFTS: ICD-10-CM

## 2023-03-02 DIAGNOSIS — J45.909 UNSPECIFIED ASTHMA, UNCOMPLICATED: ICD-10-CM

## 2023-03-02 DIAGNOSIS — Z90.12 ACQUIRED ABSENCE OF LEFT BREAST AND NIPPLE: Chronic | ICD-10-CM

## 2023-03-02 DIAGNOSIS — Z85.3 PERSONAL HISTORY OF MALIGNANT NEOPLASM OF BREAST: ICD-10-CM

## 2023-03-02 LAB
ALBUMIN SERPL ELPH-MCNC: 3.9 G/DL — SIGNIFICANT CHANGE UP (ref 3.3–5)
ALP SERPL-CCNC: 102 U/L — SIGNIFICANT CHANGE UP (ref 40–120)
ALT FLD-CCNC: 25 U/L — SIGNIFICANT CHANGE UP (ref 12–78)
ANION GAP SERPL CALC-SCNC: 7 MMOL/L — SIGNIFICANT CHANGE UP (ref 5–17)
AST SERPL-CCNC: 18 U/L — SIGNIFICANT CHANGE UP (ref 15–37)
BASOPHILS # BLD AUTO: 0.02 K/UL — SIGNIFICANT CHANGE UP (ref 0–0.2)
BASOPHILS NFR BLD AUTO: 0.4 % — SIGNIFICANT CHANGE UP (ref 0–2)
BILIRUB SERPL-MCNC: 0.3 MG/DL — SIGNIFICANT CHANGE UP (ref 0.2–1.2)
BUN SERPL-MCNC: 9 MG/DL — SIGNIFICANT CHANGE UP (ref 7–23)
CALCIUM SERPL-MCNC: 11.1 MG/DL — HIGH (ref 8.5–10.1)
CHLORIDE SERPL-SCNC: 107 MMOL/L — SIGNIFICANT CHANGE UP (ref 96–108)
CO2 SERPL-SCNC: 28 MMOL/L — SIGNIFICANT CHANGE UP (ref 22–31)
CREAT SERPL-MCNC: 0.92 MG/DL — SIGNIFICANT CHANGE UP (ref 0.5–1.3)
D DIMER BLD IA.RAPID-MCNC: 182 NG/ML DDU — SIGNIFICANT CHANGE UP
EGFR: 70 ML/MIN/1.73M2 — SIGNIFICANT CHANGE UP
EOSINOPHIL # BLD AUTO: 0.06 K/UL — SIGNIFICANT CHANGE UP (ref 0–0.5)
EOSINOPHIL NFR BLD AUTO: 1.1 % — SIGNIFICANT CHANGE UP (ref 0–6)
FLUAV AG NPH QL: SIGNIFICANT CHANGE UP
FLUBV AG NPH QL: SIGNIFICANT CHANGE UP
GLUCOSE SERPL-MCNC: 116 MG/DL — HIGH (ref 70–99)
HCT VFR BLD CALC: 34.7 % — SIGNIFICANT CHANGE UP (ref 34.5–45)
HGB BLD-MCNC: 11.3 G/DL — LOW (ref 11.5–15.5)
IMM GRANULOCYTES NFR BLD AUTO: 0.4 % — SIGNIFICANT CHANGE UP (ref 0–0.9)
LIDOCAIN IGE QN: 103 U/L — SIGNIFICANT CHANGE UP (ref 73–393)
LYMPHOCYTES # BLD AUTO: 2.86 K/UL — SIGNIFICANT CHANGE UP (ref 1–3.3)
LYMPHOCYTES # BLD AUTO: 52.3 % — HIGH (ref 13–44)
MCHC RBC-ENTMCNC: 29.3 PG — SIGNIFICANT CHANGE UP (ref 27–34)
MCHC RBC-ENTMCNC: 32.6 G/DL — SIGNIFICANT CHANGE UP (ref 32–36)
MCV RBC AUTO: 89.9 FL — SIGNIFICANT CHANGE UP (ref 80–100)
MONOCYTES # BLD AUTO: 0.61 K/UL — SIGNIFICANT CHANGE UP (ref 0–0.9)
MONOCYTES NFR BLD AUTO: 11.2 % — SIGNIFICANT CHANGE UP (ref 2–14)
NEUTROPHILS # BLD AUTO: 1.9 K/UL — SIGNIFICANT CHANGE UP (ref 1.8–7.4)
NEUTROPHILS NFR BLD AUTO: 34.6 % — LOW (ref 43–77)
NRBC # BLD: 0 /100 WBCS — SIGNIFICANT CHANGE UP (ref 0–0)
NT-PROBNP SERPL-SCNC: 20 PG/ML — SIGNIFICANT CHANGE UP (ref 0–125)
PLATELET # BLD AUTO: 233 K/UL — SIGNIFICANT CHANGE UP (ref 150–400)
POTASSIUM SERPL-MCNC: 4.2 MMOL/L — SIGNIFICANT CHANGE UP (ref 3.5–5.3)
POTASSIUM SERPL-SCNC: 4.2 MMOL/L — SIGNIFICANT CHANGE UP (ref 3.5–5.3)
PROT SERPL-MCNC: 8.4 GM/DL — HIGH (ref 6–8.3)
RBC # BLD: 3.86 M/UL — SIGNIFICANT CHANGE UP (ref 3.8–5.2)
RBC # FLD: 14 % — SIGNIFICANT CHANGE UP (ref 10.3–14.5)
SARS-COV-2 RNA SPEC QL NAA+PROBE: DETECTED
SODIUM SERPL-SCNC: 142 MMOL/L — SIGNIFICANT CHANGE UP (ref 135–145)
TROPONIN I, HIGH SENSITIVITY RESULT: 5.7 NG/L — SIGNIFICANT CHANGE UP
TSH SERPL-MCNC: 1.46 UIU/ML — SIGNIFICANT CHANGE UP (ref 0.36–3.74)
WBC # BLD: 5.47 K/UL — SIGNIFICANT CHANGE UP (ref 3.8–10.5)
WBC # FLD AUTO: 5.47 K/UL — SIGNIFICANT CHANGE UP (ref 3.8–10.5)

## 2023-03-02 PROCEDURE — 71045 X-RAY EXAM CHEST 1 VIEW: CPT | Mod: 26

## 2023-03-02 PROCEDURE — 93010 ELECTROCARDIOGRAM REPORT: CPT

## 2023-03-02 PROCEDURE — 99285 EMERGENCY DEPT VISIT HI MDM: CPT

## 2023-03-02 RX ORDER — EPLERENONE 50 MG/1
1 TABLET, FILM COATED ORAL
Qty: 0 | Refills: 0 | DISCHARGE

## 2023-03-02 RX ORDER — METFORMIN HYDROCHLORIDE 850 MG/1
1 TABLET ORAL
Qty: 0 | Refills: 0 | DISCHARGE

## 2023-03-02 RX ORDER — ATORVASTATIN CALCIUM 80 MG/1
0 TABLET, FILM COATED ORAL
Qty: 0 | Refills: 0 | DISCHARGE

## 2023-03-02 RX ORDER — RAMIPRIL 5 MG
1 CAPSULE ORAL
Qty: 0 | Refills: 0 | DISCHARGE

## 2023-03-02 RX ORDER — MONTELUKAST 4 MG/1
1 TABLET, CHEWABLE ORAL
Qty: 0 | Refills: 0 | DISCHARGE

## 2023-03-02 NOTE — ED PROVIDER NOTE - PATIENT PORTAL LINK FT
You can access the FollowMyHealth Patient Portal offered by [facility] by registering at the following website: [email or web address]. By joining [employer]’s FollowMyHealth portal, you will also be able to view your health information using other applications (apps) compatible with our system.

## 2023-03-02 NOTE — ED PROVIDER NOTE - OBJECTIVE STATEMENT
62-year-old female with past medical history hypertension, diabetes, asthma, left breast cancer status postlumpectomy presenting to the ED with complaints of palpitations x2 days, had previous symptoms in the past where she had loop recorder placed about 7 to 8 months ago which was reportedly normal.  Patient has cardiologist Dr. Rouse at [facility], denies any fever chills chest pain shortness of breath cough congestion history of DVT PE or other complaints.

## 2023-03-02 NOTE — ED PROVIDER NOTE - CLINICAL SUMMARY MEDICAL DECISION MAKING FREE TEXT BOX
62-year-old female with comorbid conditions listed above presenting to the ED with complaints of palpitations history of similar in the past with previous loop recorder with no recorded arrhythmias per patient.  We will send screening labs.  Chest x-ray and reassess, patient denies any current symptoms.  Advised to follow-up with her cardiologist at [facility] return precautions discussed

## 2023-03-02 NOTE — ED PROVIDER NOTE - CARE PROVIDER_API CALL
Oj Morris)  Internal Medicine  [address] [place] [address]  Phone: [phone]  Fax: [phone]  Follow Up Time: 7-10 Days

## 2023-03-02 NOTE — ED ADULT NURSE NOTE - OBJECTIVE STATEMENT
AOx4 as per pt she was having palpitations for 2 days and high blood pressure and sob. h/o hyperthyroid as per pt she was due for surgery. hooked to cardiac monitor HR 89on NSR. catapres patch once a week on the left arm. pt reports chest discomfort. h/o DM,HTN AOx4 as per pt she was having palpitations for 2 days and high blood pressure and sob. h/o hyperthyroid as per pt she was due for surgery. hooked to cardiac monitor HR 89on NSR. catapres patch once a week on the left arm. pt reports chest discomfort. h/o DM,HTN, Breast cancer left lumpectomy radiation and chemo 2008.

## 2023-03-02 NOTE — ED ADULT TRIAGE NOTE - CHIEF COMPLAINT QUOTE
pt aox4 here for palpiations for 2x days and generalized shaking in body.  Pt had previous symptoms in the past, but has worsened this time around. Pt also feels midsternal chest pressure and has some nausea  Pt on catapres 0.03 patch on left arm.   hx HTN, DM, Asthma, left breast cancer w/left lumpectomy.

## 2023-06-12 NOTE — ED ADULT NURSE NOTE - FUNCTIONAL LEVEL DRESSING, MLM
independent Cantharidin Pregnancy And Lactation Text: The use of this medication during pregnancy or lactation is not recommended as there is insufficient data.

## 2023-07-06 ENCOUNTER — EMERGENCY (EMERGENCY)
Facility: HOSPITAL | Age: 63
LOS: 1 days | Discharge: HOME | End: 2023-07-06
Attending: EMERGENCY MEDICINE | Admitting: EMERGENCY MEDICINE
Payer: COMMERCIAL

## 2023-07-06 VITALS
OXYGEN SATURATION: 98 % | HEIGHT: 66 IN | TEMPERATURE: 98 F | HEART RATE: 94 BPM | SYSTOLIC BLOOD PRESSURE: 186 MMHG | DIASTOLIC BLOOD PRESSURE: 99 MMHG | WEIGHT: 182.98 LBS | RESPIRATION RATE: 15 BRPM

## 2023-07-06 DIAGNOSIS — Z90.12 ACQUIRED ABSENCE OF LEFT BREAST AND NIPPLE: Chronic | ICD-10-CM

## 2023-07-06 LAB — GLUCOSE BLDC GLUCOMTR-MCNC: 103 MG/DL — HIGH (ref 70–99)

## 2023-07-06 PROCEDURE — 99283 EMERGENCY DEPT VISIT LOW MDM: CPT

## 2023-07-06 PROCEDURE — 82962 GLUCOSE BLOOD TEST: CPT

## 2023-07-06 PROCEDURE — 99284 EMERGENCY DEPT VISIT MOD MDM: CPT

## 2023-07-06 RX ORDER — LAMOTRIGINE 25 MG/1
25 TABLET, ORALLY DISINTEGRATING ORAL
Qty: 14 | Refills: 0
Start: 2023-07-06 | End: 2023-07-19

## 2023-07-06 RX ORDER — LAMOTRIGINE 25 MG/1
25 TABLET, ORALLY DISINTEGRATING ORAL ONCE
Refills: 0 | Status: COMPLETED | OUTPATIENT
Start: 2023-07-06 | End: 2023-07-06

## 2023-07-06 RX ADMIN — LAMOTRIGINE 25 MILLIGRAM(S): 25 TABLET, ORALLY DISINTEGRATING ORAL at 18:09

## 2023-07-06 NOTE — ED PROVIDER NOTE - CHPI ED SYMPTOMS NEG
no agitation/no hallucinations/no homicidal/no suicidal/no weight loss/no change in level of consciousness/no paranoia

## 2023-07-06 NOTE — ED PROVIDER NOTE - CLINICAL SUMMARY MEDICAL DECISION MAKING FREE TEXT BOX
62-year-old female with history of diabetes hypertension and bipolar disorder, has been on Lamictal for several years.  States states she ran out of Lamictal 1 week ago and could not get refill since her psychiatrist is on vacation.  Feels like this is a withdrawal from Lamictal which she has experienced once before.  Was told by her psychiatrist that if she stops her Lamictal more than 4 days she needs to restarted at a lower dose.  Requesting prescription for lower dose of Lamictal.  Denies fever headache chest pain cough shortness of breath nausea vomiting diarrhea dysuria hematuria or other symptom.  Denies suicidal homicidal thoughts.  Denies alcohol abuse.  Her psychiatrist is Dr. Mclean in [place]    Physical exam is normal.  Impression is bipolar with Lamictal withdrawal.  Plan is will attempt to consult with her psychiatrist Dr. Mclean about how to restart Lamictal.  Will recommend psych follow-up tomorrow.

## 2023-07-06 NOTE — ED PROVIDER NOTE - NSFOLLOWUPINSTRUCTIONS_ED_ALL_ED_FT
Lamotrigine Tablets  What is this medication?  LAMOTRIGINE (la ERIC tri jecatracho) prevents and controls seizures in people with epilepsy. It may also be used to treat bipolar disorder. It works by calming overactive nerves in your body.    This medicine may be used for other purposes; ask your health care provider or pharmacist if you have questions.    COMMON BRAND NAME(S): Lamictal, Subvenite    What should I tell my care team before I take this medication?  They need to know if you have any of these conditions:    Heart disease  History of irregular heartbeat  Immune system problems  Kidney disease  Liver disease  Low levels of folic acid in the blood  Lupus  Mental illness  Suicidal thoughts, plans, or attempt; a previous suicide attempt by you or a family member  An unusual or allergic reaction to lamotrigine or other seizure medications, other medications, foods, dyes, or preservatives  Pregnant or trying to get pregnant  Breast-feeding  How should I use this medication?  Take this medication by mouth with a glass of water. Follow the directions on the prescription label. Do not chew these tablets. If this medication upsets your stomach, take it with food or milk. Take your doses at regular intervals. Do not take your medication more often than directed.    A special MedGuide will be given to you by the pharmacist with each new prescription and refill. Be sure to read this information carefully each time.    Talk to your care team about the use of this medication in children. While this medication may be prescribed for children as young as 2 years for selected conditions, precautions do apply.    Overdosage: If you think you have taken too much of this medicine contact a poison control center or emergency room at once.    NOTE: This medicine is only for you. Do not share this medicine with others.    What if I miss a dose?  If you miss a dose, take it as soon as you can. If it is almost time for your next dose, take only that dose. Do not take double or extra doses.    What may interact with this medication?  Atazanavir  Birth control pills  Certain medications for irregular heartbeat  Certain medications for seizures like carbamazepine, phenobarbital, phenytoin, primidone, valproic acid  Lopinavir  Rifampin  Ritonavir  This list may not describe all possible interactions. Give your health care provider a list of all the medicines, herbs, non-prescription drugs, or dietary supplements you use. Also tell them if you smoke, drink alcohol, or use illegal drugs. Some items may interact with your medicine.    What should I watch for while using this medication?  Visit your care team for regular checks on your progress. If you take this medication for seizures, wear a Medic Alert bracelet or necklace. Carry an identification card with information about your condition, medications, and care team.    It is important to take this medication exactly as directed. When first starting treatment, your dose will need to be adjusted slowly. It may take weeks or months before your dose is stable. You should contact your care team if your seizures get worse or if you have any new types of seizures. Do not stop taking this medication unless instructed by your care team. Stopping your medication suddenly can increase your seizures or their severity.    This medication may cause serious skin reactions. They can happen weeks to months after starting the medication. Contact your care team right away if you notice fevers or flu-like symptoms with a rash. The rash may be red or purple and then turn into blisters or peeling of the skin. Or, you might notice a red rash with swelling of the face, lips or lymph nodes in your neck or under your arms.    You may get drowsy, dizzy, or have blurred vision. Do not drive, use machinery, or do anything that needs mental alertness until you know how this medication affects you. To reduce dizzy or fainting spells, do not sit or stand up quickly, especially if you are an older patient. Alcohol can increase drowsiness and dizziness. Avoid alcoholic drinks.    If you are taking this medication for bipolar disorder, it is important to report any changes in your mood to your care team. If your condition gets worse, you get mentally depressed, feel very hyperactive or manic, have difficulty sleeping, or have thoughts of hurting yourself or committing suicide, you need to get help from your care team right away. If you are a caregiver for someone taking this medication for bipolar disorder, you should also report these behavioral changes right away. The use of this medication may increase the chance of suicidal thoughts or actions. Pay special attention to how you are responding while on this medication.    Your mouth may get dry. Chewing sugarless gum or sucking hard candy, and drinking plenty of water may help. Contact your care team if the problem does not go away or is severe.    Women who become pregnant while using this medication may enroll in the [place] Antiepileptic Drug Pregnancy Registry by calling 1-149.562.5520. This registry collects information about the safety of antiepileptic medication use during pregnancy.    This medication may cause a decrease in folic acid. You should make sure that you get enough folic acid while you are taking this medication. Discuss the foods you eat and the vitamins you take with your care team.    What side effects may I notice from receiving this medication?  Side effects that you should report to your care team as soon as possible:    Allergic reactions—skin rash, itching, hives, swelling of the face, lips, tongue, or throat  Change in vision  Fever, neck pain or stiffness, sensitivity to light, headache, nausea, vomiting, confusion  Heart rhythm changes—fast or irregular heartbeat, dizziness, feeling faint or lightheaded, chest pain, trouble breathing  Infection—fever, chills, cough, or sore throat  Liver injury—right upper belly pain, loss of appetite, nausea, light-colored stool, dark yellow or brown urine, yellowing skin or eyes, unusual weakness or fatigue  Low red blood cell count—unusual weakness or fatigue, dizziness, headache, trouble breathing  Rash, fever, and swollen lymph nodes  Redness, blistering, peeling or loosening of the skin, including inside the mouth  Thoughts of suicide or self-harm, worsening mood, or feelings of depression  Unusual bruising or bleeding  Side effects that usually do not require medical attention (report to your care team if they continue or are bothersome):    Diarrhea  Dizziness  Drowsiness  Headache  Nausea  Stomach pain  Tremors or shaking  This list may not describe all possible side effects. Call your doctor for medical advice about side effects. You may report side effects to FDA at 0-093-FDA-7668.    Where should I keep my medication?  Keep out of the reach of children and pets.    Store at 25 degrees C (77 degrees F). Protect from light. Get rid of any unused medication after the expiration date.    To get rid of medications that are no longer needed or have :    Take the medication to a medication take-back program. Check with your pharmacy or law enforcement to find a location.  If you cannot return the medication, check the label or package insert to see if the medication should be thrown out in the garbage or flushed down the toilet. If you are not sure, ask your care team. If it is safe to put it in the trash, empty the medication out of the container. Mix the medication with cat litter, dirt, coffee grounds, or other unwanted substance. Seal the mixture in a bag or container. Put it in the trash.  NOTE: This sheet is a summary. It may not cover all possible information. If you have questions about this medicine, talk to your doctor, pharmacist, or health care provider.

## 2023-07-06 NOTE — ED ADULT NURSE NOTE - CHPI ED NUR TIMING2
Verified Results  URINE CULTURE [id number] 05:30PM BRIGIDA GANDARA   [Aug 10, 2017 8:59AM BRIGIDA GANDARA]  No UTI seen     Test Name Result Flag Reference   URINE CULTURE  O    SPECIMEN DESCRIPTION (SDES): URINE, CLEAN CATCH/MIDSTREAM  CULTURE (CULT):        <10,000 CFU/mL MIXED BACTERIAL MAMADOU WITH NO PREDOMINATING TYPE  REPORT STATUS (RPT):     FINAL 08/09/2017        gradual onset

## 2023-07-06 NOTE — ED PROVIDER NOTE - PATIENT PORTAL LINK FT
You can access the FollowMyHealth Patient Portal offered by [facility] by registering at the following website: [email or web address]. By joining [employer]’s FollowMyHealth portal, you will also be able to view your health information using other applications (apps) compatible with our system.

## 2023-07-06 NOTE — ED PROVIDER NOTE - OBJECTIVE STATEMENT
62-year-old female with history of diabetes hypertension and bipolar disorder, has been on Lamictal for several years.  States states she ran out of Lamictal 1 week ago and could not get refill since her psychiatrist is on vacation.  Feels like this is a withdrawal from Lamictal which she has experienced once before.  Was told by her psychiatrist that if she stops her Lamictal more than 4 days she needs to restarted at a lower dose.  Requesting prescription for lower dose of Lamictal.  Denies fever headache chest pain cough shortness of breath nausea vomiting diarrhea dysuria hematuria or other symptom.  Denies suicidal homicidal thoughts.  Denies alcohol abuse.  Her psychiatrist is Dr. Mclean in [place] 62-year-old female with history of diabetes hypertension and bipolar disorder, has been on Lamictal for several years.  States states she ran out of Lamictal 1 week ago and could not get refill since her psychiatrist is on vacation.  Feels like this is a withdrawal from Lamictal which she has experienced once before.  Was told by her psychiatrist that if she stops her Lamictal more than 4 days she needs to restart at a lower dose.  Requesting prescription for lower dose of Lamictal.  Denies fever headache chest pain cough shortness of breath nausea vomiting diarrhea dysuria hematuria or other symptom.  Denies suicidal homicidal thoughts.  Denies alcohol abuse.  Her psychiatrist is Dr. Mclean in [facility] clinic

## 2023-07-06 NOTE — ED PROVIDER NOTE - PROGRESS NOTE DETAILS
I attempted to contact patient's psychiatrist Dr. Mclean but was told by staff at the [facility] that she is on vacation and there is no covering psychiatrist.  Patient agreed to take Lamictal 25 daily for the next 2 weeks as per starter pack instructions as she has done before when she stopped Lamictal abruptly.  We will follow-up with Dr. Mclean in the next few days.  Patient then became angry about care stating she was not giving anything for pain.  I told patient and her symptoms will improve once she gets started on the Lamictal.  She stated she cannot take other pain medicines and needs something stronger.  I explained the patient she cannot have opioids while taking these psychiatric medicines and she will continue with Tylenol or Advil as directed. Finger stick blood glucose 131.  I attempted to contact patient's psychiatrist Dr. Mclean but was told by staff at the [facility] that she is on vacation and there is no covering psychiatrist.  Patient agreed to take Lamictal 25 daily for the next 2 weeks as per starter pack instructions as she has done before when she stopped Lamictal abruptly.  She will follow-up with Dr. Mclean in the next few days.  Patient then became angry about care stating she was not giving anything for pain.  I told patient that her symptoms will improve once she gets started on the Lamictal.  She stated she cannot take OTC pain medicines and needs something stronger.  I explained the patient she cannot have opioids while taking these psychiatric medicines and she will continue with Tylenol or Advil as directed. Pt said thank you and she will call her doctor tomorrow for follow up as recommended.

## 2023-07-06 NOTE — ED ADULT NURSE NOTE - NSFALLUNIVINTERV_ED_ALL_ED
Bed/Stretcher in lowest position, wheels locked, appropriate side rails in place/Call bell, personal items and telephone in reach/Instruct patient to call for assistance before getting out of bed/chair/stretcher/Non-slip footwear applied when patient is off stretcher/[place] to call system/Physically safe environment - no spills, clutter or unnecessary equipment/Purposeful proactive rounding/Room/bathroom lighting operational, light cord in reach

## 2023-07-06 NOTE — ED ADULT NURSE NOTE - OBJECTIVE STATEMENT
61yo female walked into ED, pt c/o bilat leg cramps secondary to "not being on Lamictal ". pt denies trauma.

## 2023-07-06 NOTE — ED ADULT TRIAGE NOTE - CHIEF COMPLAINT QUOTE
I have spasm in my legs for a few days, patient hasn't been taking Lamictal because it ran out and thought her spasm is from the missing dose of medication. spasm is worsen with standing position.

## 2023-09-11 NOTE — PATIENT PROFILE ADULT - NSPRONUTRITIONRISK_GEN_A_NUR
No indicators present Bilateral Helical Rim Advancement Flap Text: The defect edges were debeveled with a #15 blade scalpel.  Given the location of the defect and the proximity to free margins (helical rim) a bilateral helical rim advancement flap was deemed most appropriate.  Using a sterile surgical marker, the appropriate advancement flaps were drawn incorporating the defect and placing the expected incisions between the helical rim and antihelix where possible.  The area thus outlined was incised through and through with a #15 scalpel blade.  With a skin hook and iris scissors, the flaps were gently and sharply undermined and freed up.

## 2024-02-28 NOTE — ED ADULT TRIAGE NOTE - NS ED NURSE BANDS TYPE
Name band;
Pt advised to have family member present when performing transfer and to practice with home care therapist prior to attempting independently. Pt verbalized good understanding and agreement./supervision

## 2024-02-29 PROBLEM — Z00.00 ENCOUNTER FOR PREVENTIVE HEALTH EXAMINATION: Status: ACTIVE | Noted: 2024-02-29

## 2024-03-01 ENCOUNTER — APPOINTMENT (OUTPATIENT)
Dept: OTOLARYNGOLOGY | Facility: CLINIC | Age: 64
End: 2024-03-01
Payer: MEDICARE

## 2024-03-01 ENCOUNTER — TRANSCRIPTION ENCOUNTER (OUTPATIENT)
Age: 64
End: 2024-03-01

## 2024-03-01 DIAGNOSIS — Z85.3 PERSONAL HISTORY OF MALIGNANT NEOPLASM OF BREAST: ICD-10-CM

## 2024-03-01 DIAGNOSIS — Z86.39 PERSONAL HISTORY OF OTHER ENDOCRINE, NUTRITIONAL AND METABOLIC DISEASE: ICD-10-CM

## 2024-03-01 PROCEDURE — 31238 NSL/SINS NDSC SRG NSL HEMRRG: CPT | Mod: LT

## 2024-03-01 PROCEDURE — 99204 OFFICE O/P NEW MOD 45 MIN: CPT | Mod: 25

## 2024-03-01 RX ORDER — LACTULOSE 10 G/15ML
SOLUTION ORAL
Refills: 0 | Status: ACTIVE | COMMUNITY

## 2024-03-01 RX ORDER — FAMOTIDINE 20 MG/1
20 TABLET, FILM COATED ORAL
Qty: 90 | Refills: 1 | Status: ACTIVE | COMMUNITY
Start: 2024-03-01 | End: 1900-01-01

## 2024-03-01 RX ORDER — FAMOTIDINE 10 MG/1
TABLET, FILM COATED ORAL
Refills: 0 | Status: ACTIVE | COMMUNITY

## 2024-03-01 RX ORDER — METFORMIN HYDROCHLORIDE 500 MG/1
500 TABLET, COATED ORAL
Refills: 0 | Status: ACTIVE | COMMUNITY

## 2024-03-01 RX ORDER — L.ACIDOPH/L.BULG/B.BIF/S.THERM 1B-250 MG
TABLET ORAL
Refills: 0 | Status: ACTIVE | COMMUNITY

## 2024-03-01 RX ORDER — TEZEPELUMAB-EKKO 210 MG/1.9ML
210 INJECTION, SOLUTION SUBCUTANEOUS
Refills: 0 | Status: ACTIVE | COMMUNITY

## 2024-03-01 RX ORDER — CETIRIZINE HYDROCHLORIDE 5 MG/1
5 TABLET ORAL
Refills: 0 | Status: ACTIVE | COMMUNITY

## 2024-03-01 RX ORDER — VERAPAMIL HYDROCHLORIDE 80 MG/1
TABLET ORAL
Refills: 0 | Status: ACTIVE | COMMUNITY

## 2024-03-01 RX ORDER — RAMIPRIL 10 MG/1
10 CAPSULE ORAL
Refills: 0 | Status: ACTIVE | COMMUNITY

## 2024-03-01 RX ORDER — ROSUVASTATIN CALCIUM 5 MG/1
TABLET, FILM COATED ORAL
Refills: 0 | Status: ACTIVE | COMMUNITY

## 2024-03-01 RX ORDER — CLONIDINE 0.3 MG/24H
0.3 PATCH, EXTENDED RELEASE TRANSDERMAL
Refills: 0 | Status: ACTIVE | COMMUNITY

## 2024-03-01 RX ORDER — MONTELUKAST 10 MG/1
TABLET, FILM COATED ORAL
Refills: 0 | Status: ACTIVE | COMMUNITY

## 2024-03-01 RX ORDER — FLUTICASONE FUROATE, UMECLIDINIUM BROMIDE AND VILANTEROL TRIFENATATE 200; 62.5; 25 UG/1; UG/1; UG/1
POWDER RESPIRATORY (INHALATION)
Refills: 0 | Status: ACTIVE | COMMUNITY

## 2024-03-01 RX ORDER — EPLERENONE 50 MG/1
TABLET, FILM COATED ORAL
Refills: 0 | Status: ACTIVE | COMMUNITY

## 2024-03-01 RX ORDER — EPINEPHRINE HCL 1 MG/ML
0.1 SOLUTION, NON-ORAL NASAL
Refills: 0 | Status: ACTIVE | COMMUNITY

## 2024-03-01 RX ORDER — ALBUTEROL SULFATE AND BUDESONIDE 90; 80 UG/1; UG/1
AEROSOL, METERED RESPIRATORY (INHALATION)
Refills: 0 | Status: ACTIVE | COMMUNITY

## 2024-03-07 NOTE — ASSESSMENT
[FreeTextEntry1] : 63 year F present for recurrent epistaxis. Patient is naive to any moisturizing nasal sprays or gels.    Nasal endoscopy shows No polyps, purulence or masses, moderate postcricoid edema, moderate arytenoids edema. Left floor of anterior nasal septal exposed varicose. Area was cauterized with silver nitrate   Recommend: Recurrent Epistaxis - Discussed need to increased moisture therapy with saline spray and gel, along with the application technique and what to do if epistaxis.  - Start Nasal Saline Spray/Gel ( (e.g. Ocean Spray Nasal Saline, AYR Nasal Gel) to both anterior nares. Spray/Gel should be use at least 3-4X daily. You can't overuse saltwater spray. - Most nosebleeds start from the front of the nose on the septum (the part of the nose that divides it into a right and left side).  The skin on the septum can dry out and crack, exposing blood vessels which then bleed.   - Discussed how to manage active nose bleed: 1) Lean forward to prevent swallowing blood.  Swallowing too much blood can make you sick and vomit. This also prevents possible aspiration of blood 2) Spray Afrin (oxymetazoline) an over-the-counter nasal decongestant that some people use to help stop ACTIVE bleeding. PLEASE BE CAUTIONED: IT SHOULD NOT BE USED more than 3 days or if you have heart or blood pressure issues.   3) Pinch the nostrils closed with moderate pressure for 5-10 minutes after spraying Afrin. 4) If still bleeding spray Afrin again and give it another 5-10 minutes. 5) If the amount of bleeding cannot be control it at home, please go to the emergency room.  Laryngopharyngeal Reflux - Discussed Lifestyle changes as the most effective methods to improvement LPR. Weight Loss if overweight. Avoid foods that increase the level of acid in your stomach, including caffeinated/carbonated drinks.  - Avoid foods that decrease the pressure in the lower esophagus, such as fatty foods, alcohol and peppermint. - Avoid large meals.Try to have an empty stomach 2 hours before going to bed. If still smoking please Quit. - Head of bed elevation when you lying down -LPR Handout Given - Discussed Sodium alginate as mechanical plug for preventing reflux - Discussed with Patient if they have not seen Gastroenterolgy in the past for endoscopy they should follow up as chronic reflux can causes mucosal changes in the lining of the lower esophagus  - Send to Pharmacy Famotidine 20mg at bedtime  -Return to clinic in 1 months or sooner if new/worsen symptoms present

## 2024-03-07 NOTE — HISTORY OF PRESENT ILLNESS
[de-identified] : 63 year old female presents for nose bleeds. History of nose bleeds, Parathyroidectomy Dr. Reynolds States for the last 3 months she has been having frequent nose bleeds States last episode the bleed lasted for 20 minutes, stopped with manual pressure. Denies using blood thinners. Patient states also feels irritation in throat

## 2024-03-22 ENCOUNTER — APPOINTMENT (OUTPATIENT)
Dept: OTOLARYNGOLOGY | Facility: CLINIC | Age: 64
End: 2024-03-22
Payer: MEDICARE

## 2024-03-22 VITALS — WEIGHT: 192 LBS | HEIGHT: 66 IN | BODY MASS INDEX: 30.86 KG/M2

## 2024-03-22 PROCEDURE — 99214 OFFICE O/P EST MOD 30 MIN: CPT | Mod: 25

## 2024-03-22 PROCEDURE — 31231 NASAL ENDOSCOPY DX: CPT

## 2024-03-22 RX ORDER — SOD CHLOR,BICARB/SQUEEZ BOTTLE
PACKET, WITH RINSE DEVICE NASAL
Qty: 1 | Refills: 3 | Status: ACTIVE | COMMUNITY
Start: 2024-03-22 | End: 1900-01-01

## 2024-03-22 RX ORDER — MUPIROCIN 20 MG/G
2 OINTMENT TOPICAL TWICE DAILY
Qty: 1 | Refills: 0 | Status: ACTIVE | COMMUNITY
Start: 2024-03-22 | End: 1900-01-01

## 2024-03-22 RX ORDER — FLUTICASONE PROPIONATE 50 UG/1
50 SPRAY, METERED NASAL DAILY
Qty: 1 | Refills: 2 | Status: ACTIVE | COMMUNITY
Start: 2024-03-22 | End: 1900-01-01

## 2024-03-22 NOTE — HISTORY OF PRESENT ILLNESS
[de-identified] : 63 year old female presents for follow up evaluation of recurrent epistaxis. History of HTN, Parathyroidectomy Dr. Reynolds. Last seen 3/01 at which time left floor of anterior nasal septal cauterized with silver nitrate. States did well after cauterization. Using MAURA as advised.  Reports having yellow nasal drainage and postnasal drip which began Monday. Had 2 left sided nosebleeds on Wednesday, lasting about 15 mins. Patient is not currently on anticoagulant medications.

## 2024-03-22 NOTE — ASSESSMENT
[FreeTextEntry1] : 63 year F present for Epistaxis, PND, LPRD. Patient is naive to any moisturizing nasal sprays or gels.    Nasal endoscopy shows No polyps, purulence or masses, moderate postcricoid edema, moderate arytenoids edema. Left Floor of nasal cavity granulomatous tissues. Can consider biopsy did doesn't resolved.  Recommend: Recurrent Epistaxis -Improved - Discussed need to increased moisture therapy with saline spray and gel, along with the application technique and what to do if epistaxis.  - Most nosebleeds start from the front of the nose on the septum (the part of the nose that divides it into a right and left side).  The skin on the septum can dry out and crack, exposing blood vessels which then bleed.  - Continue Nasal Saline Spray/Gel ( (e.g. Ocean Spray Nasal Saline, AYR Nasal Gel) to both anterior nares. Spray/Gel should be use at least 3-4X daily. You can't overuse saltwater spray. -Start Mupirocin BID for 1 month to anterior portion of nose -Left Floor of nasal cavity granulomatous tissues. Can consider biopsy if doesn't resolved.  - Discussed how to manage active nose bleed: 1) Lean forward to prevent swallowing blood.  Swallowing too much blood can make you sick and vomit. This also prevents possible aspiration of blood 2) Spray Afrin (oxymetazoline) an over-the-counter nasal decongestant that some people use to help stop ACTIVE bleeding. PLEASE BE CAUTIONED: IT SHOULD NOT BE USED more than 3 days or if you have heart or blood pressure issues.   3) Pinch the nostrils closed with moderate pressure for 5-10 minutes after spraying Afrin. 4) If still bleeding spray Afrin again and give it another 5-10 minutes. 5) If the amount of bleeding cannot be control it at home, please go to the emergency room.  Laryngopharyngeal Reflux - Discussed Lifestyle changes as the most effective methods to improvement LPR. Weight Loss if overweight. Avoid foods that increase the level of acid in your stomach, including caffeinated/carbonated drinks.  - Avoid foods that decrease the pressure in the lower esophagus, such as fatty foods, alcohol and peppermint. - Avoid large meals.Try to have an empty stomach 2 hours before going to bed. If still smoking please Quit. - Head of bed elevation when you lying down -LPR Handout Given - Discussed Sodium alginate as mechanical plug for preventing reflux - Discussed with Patient if they have not seen Gastroenterology in the past for endoscopy they should follow up as chronic reflux can causes mucosal changes in the lining of the lower esophagus  - Continue Famotidine 20mg at bedtime  PND -Discussed Post-nasal drip occurs when mucus from the nasal passages and sinuses drains into the throat. If the mucus becomes thick, post-nasal drip can cause problems such as a sore throat, laryngitis, a cough, bad breath, hoarseness, and sometimes wheezing. -Discussed the importance of rinsing the sinus before using nasal spray so that excess mucus lining the nasal cavity can be wash away so medication can penetration the nose. -Continue Sinus Rinse + Flonase nasal spray  -Return to clinic in 2 months or sooner if new/worsen symptoms present

## 2024-04-11 ENCOUNTER — NON-APPOINTMENT (OUTPATIENT)
Age: 64
End: 2024-04-11

## 2024-04-12 ENCOUNTER — APPOINTMENT (OUTPATIENT)
Dept: OTOLARYNGOLOGY | Facility: CLINIC | Age: 64
End: 2024-04-12
Payer: MEDICARE

## 2024-04-12 VITALS
RESPIRATION RATE: 17 BRPM | BODY MASS INDEX: 30.37 KG/M2 | OXYGEN SATURATION: 98 % | WEIGHT: 189 LBS | SYSTOLIC BLOOD PRESSURE: 151 MMHG | HEART RATE: 75 BPM | DIASTOLIC BLOOD PRESSURE: 96 MMHG | HEIGHT: 66 IN

## 2024-04-12 DIAGNOSIS — R09.82 POSTNASAL DRIP: ICD-10-CM

## 2024-04-12 DIAGNOSIS — R04.0 EPISTAXIS: ICD-10-CM

## 2024-04-12 DIAGNOSIS — K21.9 GASTRO-ESOPHAGEAL REFLUX DISEASE W/OUT ESOPHAGITIS: ICD-10-CM

## 2024-04-12 DIAGNOSIS — J34.89 OTHER SPECIFIED DISORDERS OF NOSE AND NASAL SINUSES: ICD-10-CM

## 2024-04-12 PROCEDURE — 99214 OFFICE O/P EST MOD 30 MIN: CPT | Mod: 25

## 2024-04-12 PROCEDURE — 30100 INTRANASAL BIOPSY: CPT

## 2024-04-17 NOTE — ASSESSMENT
[FreeTextEntry1] : 63 year F present for Epistaxis, PND, LPRD, Nasal lesion.   Nasal endoscopy shows No polyps, purulence or masses, moderate postcricoid edema, moderate arytenoids edema. Left Floor of nasal cavity vascular lesion possible granulomatous tissues.   Discussed risk of biopsy included but not limited to bleeding, infection, and misdiagnosis if inadequate tissue sample. Patient Consents to biopsy. Left Nasal floor lesion biopsy and cauterized with silver nitrate with overlap with Surgicel   Recommend: Recurrent Epistaxis  - Discussed need to increased moisture therapy with saline spray and gel, along with the application technique and what to do if epistaxis.  - Most nosebleeds start from the front of the nose on the septum (the part of the nose that divides it into a right and left side).  The skin on the septum can dry out and crack, exposing blood vessels which then bleed.  - Continue Nasal Saline Spray/Gel ( (e.g. Ocean Spray Nasal Saline, AYR Nasal Gel) to both anterior nares. Spray/Gel should be use at least 3-4X daily. You can't overuse saltwater spray. -Start Mupirocin BID for 1 month to anterior portion of nose -Left Floor of nasal cavity granulomatous tissues. Can consider biopsy if doesn't resolved.  - Discussed how to manage active nose bleed: 1) Lean forward to prevent swallowing blood.  Swallowing too much blood can make you sick and vomit. This also prevents possible aspiration of blood 2) Spray Afrin (oxymetazoline) an over-the-counter nasal decongestant that some people use to help stop ACTIVE bleeding. PLEASE BE CAUTIONED: IT SHOULD NOT BE USED more than 3 days or if you have heart or blood pressure issues.   3) Pinch the nostrils closed with moderate pressure for 5-10 minutes after spraying Afrin. 4) If still bleeding spray Afrin again and give it another 5-10 minutes. 5) If the amount of bleeding cannot be control it at home, please go to the emergency room.  Left Nasal Lesion -Lesion possible granulation from CPAP machine -Pathology Sent  Laryngopharyngeal Reflux -Improved - Discussed Lifestyle changes as the most effective methods to improvement LPR. Weight Loss if overweight. Avoid foods that increase the level of acid in your stomach, including caffeinated/carbonated drinks.  - Avoid foods that decrease the pressure in the lower esophagus, such as fatty foods, alcohol and peppermint. - Avoid large meals.Try to have an empty stomach 2 hours before going to bed. If still smoking please Quit. - Head of bed elevation when you lying down -LPR Handout Given - Discussed Sodium alginate as mechanical plug for preventing reflux - Discussed with Patient if they have not seen Gastroenterology in the past for endoscopy they should follow up as chronic reflux can causes mucosal changes in the lining of the lower esophagus  - Continue Famotidine 20mg at bedtime  PND - Improved -Discussed Post-nasal drip occurs when mucus from the nasal passages and sinuses drains into the throat. If the mucus becomes thick, post-nasal drip can cause problems such as a sore throat, laryngitis, a cough, bad breath, hoarseness, and sometimes wheezing. -Discussed the importance of rinsing the sinus before using nasal spray so that excess mucus lining the nasal cavity can be wash away so medication can penetration the nose. -Continue Sinus Rinse + Flonase nasal spray  -Return to clinic in 2 months or sooner if new/worsen symptoms present

## 2024-04-17 NOTE — HISTORY OF PRESENT ILLNESS
[de-identified] : 63 year F present for Epistaxis, PND, and LPRD Last seen 03/22/24 prescribed Mupirocin, Fluticasone and sinus rinse  PMH: HTN, Parathyroidectomy and L Breast CA s/p radiation, chemo and lumpectomy  Reports no epistaxis since last seen but began to have multiple nose bleeds today with clots  s/p cauterization  Using saline gel to nostrils daily  Reports PND and Reflux remains the same, still using Pepcid daily  Using Fluticasone 2x a day and saline irrigations once a day

## 2024-04-18 ENCOUNTER — NON-APPOINTMENT (OUTPATIENT)
Age: 64
End: 2024-04-18

## 2024-04-18 LAB — CORE LAB BIOPSY: NORMAL

## 2024-05-09 ENCOUNTER — EMERGENCY (EMERGENCY)
Facility: HOSPITAL | Age: 64
LOS: 1 days | Discharge: HOME | End: 2024-05-09
Attending: EMERGENCY MEDICINE | Admitting: EMERGENCY MEDICINE
Payer: COMMERCIAL

## 2024-05-09 VITALS
TEMPERATURE: 98 F | HEART RATE: 79 BPM | HEIGHT: 66 IN | OXYGEN SATURATION: 99 % | SYSTOLIC BLOOD PRESSURE: 119 MMHG | RESPIRATION RATE: 16 BRPM | WEIGHT: 187.39 LBS | DIASTOLIC BLOOD PRESSURE: 77 MMHG

## 2024-05-09 VITALS
OXYGEN SATURATION: 98 % | TEMPERATURE: 98 F | RESPIRATION RATE: 16 BRPM | SYSTOLIC BLOOD PRESSURE: 122 MMHG | DIASTOLIC BLOOD PRESSURE: 85 MMHG | HEART RATE: 76 BPM

## 2024-05-09 DIAGNOSIS — Z90.12 ACQUIRED ABSENCE OF LEFT BREAST AND NIPPLE: Chronic | ICD-10-CM

## 2024-05-09 LAB
ALBUMIN SERPL ELPH-MCNC: 3.9 G/DL — SIGNIFICANT CHANGE UP (ref 3.3–5)
ALP SERPL-CCNC: 130 U/L — HIGH (ref 30–120)
ALT FLD-CCNC: 23 U/L — SIGNIFICANT CHANGE UP (ref 10–60)
ANION GAP SERPL CALC-SCNC: 3 MMOL/L — LOW (ref 5–17)
APTT BLD: 32.7 SEC — SIGNIFICANT CHANGE UP (ref 24.5–35.6)
AST SERPL-CCNC: 19 U/L — SIGNIFICANT CHANGE UP (ref 10–40)
BASOPHILS # BLD AUTO: 0.03 K/UL — SIGNIFICANT CHANGE UP (ref 0–0.2)
BASOPHILS NFR BLD AUTO: 0.6 % — SIGNIFICANT CHANGE UP (ref 0–2)
BILIRUB SERPL-MCNC: 0.5 MG/DL — SIGNIFICANT CHANGE UP (ref 0.2–1.2)
BUN SERPL-MCNC: 13 MG/DL — SIGNIFICANT CHANGE UP (ref 7–23)
CALCIUM SERPL-MCNC: 11.3 MG/DL — HIGH (ref 8.4–10.5)
CHLORIDE SERPL-SCNC: 107 MMOL/L — SIGNIFICANT CHANGE UP (ref 96–108)
CO2 SERPL-SCNC: 30 MMOL/L — SIGNIFICANT CHANGE UP (ref 22–31)
CREAT SERPL-MCNC: 0.92 MG/DL — SIGNIFICANT CHANGE UP (ref 0.5–1.3)
D DIMER BLD IA.RAPID-MCNC: 396 NG/ML DDU — HIGH
EGFR: 69 ML/MIN/1.73M2 — SIGNIFICANT CHANGE UP
EOSINOPHIL # BLD AUTO: 0.06 K/UL — SIGNIFICANT CHANGE UP (ref 0–0.5)
EOSINOPHIL NFR BLD AUTO: 1.1 % — SIGNIFICANT CHANGE UP (ref 0–6)
GLUCOSE SERPL-MCNC: 109 MG/DL — HIGH (ref 70–99)
HCT VFR BLD CALC: 32.2 % — LOW (ref 34.5–45)
HGB BLD-MCNC: 10.5 G/DL — LOW (ref 11.5–15.5)
IMM GRANULOCYTES NFR BLD AUTO: 0.4 % — SIGNIFICANT CHANGE UP (ref 0–0.9)
INR BLD: 1.09 RATIO — SIGNIFICANT CHANGE UP (ref 0.85–1.18)
LYMPHOCYTES # BLD AUTO: 1.91 K/UL — SIGNIFICANT CHANGE UP (ref 1–3.3)
LYMPHOCYTES # BLD AUTO: 36 % — SIGNIFICANT CHANGE UP (ref 13–44)
MCHC RBC-ENTMCNC: 29.2 PG — SIGNIFICANT CHANGE UP (ref 27–34)
MCHC RBC-ENTMCNC: 32.6 GM/DL — SIGNIFICANT CHANGE UP (ref 32–36)
MCV RBC AUTO: 89.7 FL — SIGNIFICANT CHANGE UP (ref 80–100)
MONOCYTES # BLD AUTO: 0.46 K/UL — SIGNIFICANT CHANGE UP (ref 0–0.9)
MONOCYTES NFR BLD AUTO: 8.7 % — SIGNIFICANT CHANGE UP (ref 2–14)
NEUTROPHILS # BLD AUTO: 2.83 K/UL — SIGNIFICANT CHANGE UP (ref 1.8–7.4)
NEUTROPHILS NFR BLD AUTO: 53.2 % — SIGNIFICANT CHANGE UP (ref 43–77)
NRBC # BLD: 0 /100 WBCS — SIGNIFICANT CHANGE UP (ref 0–0)
PLATELET # BLD AUTO: 250 K/UL — SIGNIFICANT CHANGE UP (ref 150–400)
POTASSIUM SERPL-MCNC: 4.7 MMOL/L — SIGNIFICANT CHANGE UP (ref 3.5–5.3)
POTASSIUM SERPL-SCNC: 4.7 MMOL/L — SIGNIFICANT CHANGE UP (ref 3.5–5.3)
PROT SERPL-MCNC: 7.8 G/DL — SIGNIFICANT CHANGE UP (ref 6–8.3)
PROTHROM AB SERPL-ACNC: 11.8 SEC — SIGNIFICANT CHANGE UP (ref 9.5–13)
RBC # BLD: 3.59 M/UL — LOW (ref 3.8–5.2)
RBC # FLD: 14.4 % — SIGNIFICANT CHANGE UP (ref 10.3–14.5)
SODIUM SERPL-SCNC: 140 MMOL/L — SIGNIFICANT CHANGE UP (ref 135–145)
TROPONIN I, HIGH SENSITIVITY RESULT: 5.5 NG/L — SIGNIFICANT CHANGE UP
TROPONIN I, HIGH SENSITIVITY RESULT: <4 NG/L — SIGNIFICANT CHANGE UP
WBC # BLD: 5.31 K/UL — SIGNIFICANT CHANGE UP (ref 3.8–10.5)
WBC # FLD AUTO: 5.31 K/UL — SIGNIFICANT CHANGE UP (ref 3.8–10.5)

## 2024-05-09 PROCEDURE — 78582 LUNG VENTILAT&PERFUS IMAGING: CPT | Mod: MC

## 2024-05-09 PROCEDURE — A9540: CPT

## 2024-05-09 PROCEDURE — 78582 LUNG VENTILAT&PERFUS IMAGING: CPT | Mod: 26,MC

## 2024-05-09 PROCEDURE — A9567: CPT

## 2024-05-09 PROCEDURE — 99285 EMERGENCY DEPT VISIT HI MDM: CPT | Mod: 25

## 2024-05-09 PROCEDURE — 93005 ELECTROCARDIOGRAM TRACING: CPT

## 2024-05-09 PROCEDURE — 80053 COMPREHEN METABOLIC PANEL: CPT

## 2024-05-09 PROCEDURE — 99285 EMERGENCY DEPT VISIT HI MDM: CPT

## 2024-05-09 PROCEDURE — 71045 X-RAY EXAM CHEST 1 VIEW: CPT | Mod: 26

## 2024-05-09 PROCEDURE — 93010 ELECTROCARDIOGRAM REPORT: CPT

## 2024-05-09 PROCEDURE — 85379 FIBRIN DEGRADATION QUANT: CPT

## 2024-05-09 PROCEDURE — 85730 THROMBOPLASTIN TIME PARTIAL: CPT

## 2024-05-09 PROCEDURE — 85025 COMPLETE CBC W/AUTO DIFF WBC: CPT

## 2024-05-09 PROCEDURE — 71045 X-RAY EXAM CHEST 1 VIEW: CPT

## 2024-05-09 PROCEDURE — 93970 EXTREMITY STUDY: CPT

## 2024-05-09 PROCEDURE — 36415 COLL VENOUS BLD VENIPUNCTURE: CPT

## 2024-05-09 PROCEDURE — 84484 ASSAY OF TROPONIN QUANT: CPT

## 2024-05-09 PROCEDURE — 85610 PROTHROMBIN TIME: CPT

## 2024-05-09 PROCEDURE — 93970 EXTREMITY STUDY: CPT | Mod: 26

## 2024-05-09 NOTE — ED PROVIDER NOTE - OBJECTIVE STATEMENT
Patient is a 63-year-old female with past medical history of diabetes hypertension breast ca hyperlipidemia COPD coming in for intermittent chest pain for the last 2 weeks worse since yesterday.  Patient states pain is sharp located under left breast.  Patient states having some shortness of breath today and noted her blood pressure was high checked was 150's/90's.  Patient denies any recent travels leg swelling nausea vomiting cough fever chills.     cardio [facility]

## 2024-05-09 NOTE — ED PROVIDER NOTE - CLINICAL SUMMARY MEDICAL DECISION MAKING FREE TEXT BOX
Patient complaining of intermittent left lower chest pain for the past 2 weeks which worsened yesterday.  Patient reports pain associated with shortness of breath.  Patient had leg cramps yesterday none currently.  No fevers chills cough abdominal pain vomiting edema travel.  No history of PE or DVT    Plan EKG chest x-ray lower extremity Dopplers labs    Differential including but not limited to MI ACS PE DVT pneumothorax pneumonia effusion

## 2024-05-09 NOTE — ED PROVIDER NOTE - DIFFERENTIAL DIAGNOSIS
Differential including but not limited to MI ACS PE DVT pneumothorax pneumonia effusion Differential Diagnosis

## 2024-05-09 NOTE — ED PROVIDER NOTE - PATIENT PORTAL LINK FT
You can access the FollowMyHealth Patient Portal offered by [facility] by registering at the following website: [email or web address]. By joining [facility]’s FollowMyHealth portal, you will also be able to view your health information using other applications (apps) compatible with our system.

## 2024-05-09 NOTE — ED ADULT NURSE NOTE - OBJECTIVE STATEMENT
Pt came in for chest pain started yesterday. Pt denies any sob, vomiting, nausea, abdominal pain , diarrhea , fever or chills.

## 2024-05-09 NOTE — ED ADULT NURSE NOTE - HISTORY OF COVID-19 VACCINATION
Medicare Wellness Visit, Female     The best way to live healthy is to have a lifestyle where you eat a well-balanced diet, exercise regularly, limit alcohol use, and quit all forms of tobacco/nicotine, if applicable. Regular preventive services are another way to keep healthy. Preventive services (vaccines, screening tests, monitoring & exams) can help personalize your care plan, which helps you manage your own care. Screening tests can find health problems at the earliest stages, when they are easiest to treat. [facility] follows the current, evidence-based guidelines published by the [facility] Connor ([facility]) when recommending preventive services for our patients. Because we follow these guidelines, sometimes recommendations change over time as research supports it. (For example, mammograms used to be recommended annually. Even though Medicare will still pay for an annual mammogram, the newer guidelines recommend a mammogram every two years for women of average risk). Of course, you and your doctor may decide to screen more often for some diseases, based on your risk and your co-morbidities (chronic disease you are already diagnosed with). Preventive services for you include:  - Medicare offers their members a free annual wellness visit, which is time for you and your primary care provider to discuss and plan for your preventive service needs. Take advantage of this benefit every year!  -All adults over the age of 72 should receive the recommended pneumonia vaccines. Current USPSTF guidelines recommend a series of two vaccines for the best pneumonia protection.   -All adults should have a flu vaccine yearly and a tetanus vaccine every 10 years.   -All adults age 48 and older should receive the shingles vaccines (series of two vaccines).       -All adults age 38-68 who are overweight should have a diabetes screening test once every three years.   -All adults born between [birth year] and 1967 should be screened once for Hepatitis C.  -Other screening tests and preventive services for persons with diabetes include: an eye exam to screen for diabetic retinopathy, a kidney function test, a foot exam, and stricter control over your cholesterol.   -Cardiovascular screening for adults with routine risk involves an electrocardiogram (ECG) at intervals determined by your doctor.   -Colorectal cancer screenings should be done for adults age 54-65 with no increased risk factors for colorectal cancer. There are a number of acceptable methods of screening for this type of cancer. Each test has its own benefits and drawbacks. Discuss with your doctor what is most appropriate for you during your annual wellness visit. The different tests include: colonoscopy (considered the best screening method), a fecal occult blood test, a fecal DNA test, and sigmoidoscopy.    -A bone mass density test is recommended when a woman turns 65 to screen for osteoporosis. This test is only recommended one time, as a screening. Some providers will use this same test as a disease monitoring tool if you already have osteoporosis. -Breast cancer screenings are recommended every other year for women of normal risk, age 54-69.  -Cervical cancer screenings for women over age 72 are only recommended with certain risk factors. Here is a list of your current Health Maintenance items (your personalized list of preventive services) with a due date:  Health Maintenance Due   Topic Date Due    Glaucoma Screening   10/01/2018              Preventing Falls: Care Instructions  Your Care Instructions    Getting around your home safely can be a challenge if you have injuries or health problems that make it easy for you to fall. Loose rugs and furniture in walkways are among the dangers for many older people who have problems walking or who have poor eyesight.  People who have conditions such as arthritis, osteoporosis, or dementia also have to be careful not to fall. You can make your home safer with a few simple measures. Follow-up care is a key part of your treatment and safety. Be sure to make and go to all appointments, and call your doctor if you are having problems. It's also a good idea to know your test results and keep a list of the medicines you take. How can you care for yourself at home? Taking care of yourself  · You may get dizzy if you do not drink enough water. To prevent dehydration, drink plenty of fluids, enough so that your urine is light yellow or clear like water. Choose water and other caffeine-free clear liquids. If you have kidney, heart, or liver disease and have to limit fluids, talk with your doctor before you increase the amount of fluids you drink. · Exercise regularly to improve your strength, muscle tone, and balance. Walk if you can. Swimming may be a good choice if you cannot walk easily. · Have your vision and hearing checked each year or any time you notice a change. If you have trouble seeing and hearing, you might not be able to avoid objects and could lose your balance. · Know the side effects of the medicines you take. Ask your doctor or pharmacist whether the medicines you take can affect your balance. Sleeping pills or sedatives can affect your balance. · Limit the amount of alcohol you drink. Alcohol can impair your balance and other senses. · Ask your doctor whether calluses or corns on your feet need to be removed. If you wear loose-fitting shoes because of calluses or corns, you can lose your balance and fall. · Talk to your doctor if you have numbness in your feet. Preventing falls at home  · Remove raised doorway thresholds, throw rugs, and clutter. Repair loose carpet or raised areas in the floor. · Move furniture and electrical cords to keep them out of walking paths.   · Use nonskid floor wax, and wipe up spills right away, especially on ceramic tile floors. · If you use a walker or cane, put rubber tips on it. If you use crutches, clean the bottoms of them regularly with an abrasive pad, such as steel wool. · Keep your house well lit, especially [place]onse Shave, and outside walkways. Use night-lights in areas such as hallways and bathrooms. Add extra light switches or use remote switches (such as switches that go on or off when you clap your hands) to make it easier to turn lights on if you have to get up during the night. · Install sturdy handrails on stairways. · Move items in your cabinets so that the things you use a lot are on the lower shelves (about waist level). · Keep a cordless phone and a flashlight with new batteries by your bed. If possible, put a phone in each of the main rooms of your house, or carry a cell phone in case you fall and cannot reach a phone. Or, you can wear a device around your neck or wrist. You push a button that sends a signal for help. · Wear low-heeled shoes that fit well and give your feet good support. Use footwear with nonskid soles. Check the heels and soles of your shoes for wear. Repair or replace worn heels or soles. · Do not wear socks without shoes on wood floors. · Walk on the grass when the sidewalks are slippery. If you live in an area that gets snow and ice in the winter, sprinkle salt on slippery steps and sidewalks. Preventing falls in the bath  · Install grab bars and nonskid mats inside and outside your shower or tub and near the toilet and sinks. · Use shower chairs and bath benches. · Use a hand-held shower head that will allow you to sit while showering. · Get into a tub or shower by putting the weaker leg in first. Get out of a tub or shower with your strong side first.  · Repair loose toilet seats and consider installing a raised toilet seat to make getting on and off the toilet easier. · Keep your bathroom door unlocked while you are in the shower. Where can you learn more?   Go to http://francisco javier-nadiya.info/. Enter [phone] [id number] in the search box to learn more about \"Preventing Falls: Care Instructions. \"  Current as of: March 16, 2018  Content Version: 11.8  © 7022-3419 [employer]. Care instructions adapted under license by [employer] (which disclaims liability or warranty for this information). If you have questions about a medical condition or this instruction, always ask your healthcare professional. [address] any warranty or liability for your use of this information. Yes

## 2024-05-09 NOTE — ED PROVIDER NOTE - NSCAREINITIATED _GEN_ER
[FreeTextEntry1] : \par Pleasant, in no distress. Language: English\par HEENT: Head: no trauma. Eyes: no discharge. Ears: No discharge. Nose No discharge. Throat: clear\par Neck: FAROM. Negative Spurlings\par Heart: RR, +S1, S2\par Lungs: CTA\par Abdomen: soft, NT\par Lumbar spine: FAROM, no spasm\par \par LUE: Shoulder:FAROM, MS 5/5\par Elbow: FAROM, MS 5/5 reflexes 2/4\par Wrist: FAROM, MS 5/5 reflexes 2/4\par Warm, nontender, pulse 2+\par \par RUE:Shoulder:FAROM, MS 5/5\par Elbow: FAROM, MS 5/5 reflexes 2/4\par Wrist: FAROM, MS 5/5 reflexes 2/4\par Warm, nontender, pulse 2+\par \par LLE: Hip: FAROM, MS 5/5\par Knee: FAROM, MS 5-/5 Positive crepitus.  No instability.reflexes 2/4\par Ankle: FAROM, MS 5/5 reflexes 2/4\par Warm , nontender, pulse 2+ negative homans\par \par RLE: Hip: FAROM, MS 5/5\par Knee: FAROM, MS 5-/5 Positive crepitus.  No instability. Very tender on palpation of the medial aspect of the knee.  Tender on palpation of the quadriceps insertion. reflexes 2/4\par Ankle: FAROM, MS 5/5 reflexes 2/4\par Warm , nontender, pulse 2+ negative homans\par \par Gait: Spontaneous, reciprocal, safe without an assistive device\par \par Sensation\par RUE: sensation is intact to light touch, pinprick  and proprioception\par LUE: sensation is intact to light touch, pinprick  and proprioception\par RLE: sensation is intact to light touch, pinprick  and proprioception. Neg SLR. Neg RAMU, Neg FADIR\par LLE: sensation is intact to light touch, pinprick  and proprioception. Neg SLR. Neg RAMU, Neg FADIR\par \par  Santos Crenshaw(PA)

## 2024-05-09 NOTE — ED PROVIDER NOTE - CARE PROVIDER_API CALL
Carlo Woods  Cardiology  [address]  [place] [address]  Phone: [phone]  Fax: [phone]  Follow Up Time:

## 2024-05-09 NOTE — ED PROVIDER NOTE - NSFOLLOWUPINSTRUCTIONS_ED_ALL_ED_FT
Follow up with cardiology  return to er for any worsening symptoms     Nonspecific Chest Pain, Adult  Chest pain is an uncomfortable, tight, or painful feeling in the chest. The pain can feel like a crushing, aching, or squeezing pressure. A person can feel a burning or tingling sensation. Chest pain can also be felt in your back, neck, jaw, shoulder, or arm. This pain can be worse when you move, sneeze, or take a deep breath.    Chest pain can be caused by a condition that is life-threatening. This must be treated right away. It can also be caused by something that is not life-threatening. If you have chest pain, it can be hard to know the difference, so it is important to get help right away to make sure that you do not have a serious condition.    Some life-threatening causes of chest pain include:  Heart attack.  A tear in the body's main blood vessel (aortic dissection).  Inflammation around your heart (pericarditis).  A problem in the lungs, such as a blood clot (pulmonary embolism) or a collapsed lung (pneumothorax).  Some non life-threatening causes of chest pain include:  Heartburn.  Anxiety or stress.  Damage to the bones, muscles, and cartilage that make up your chest wall.  Pneumonia or bronchitis.  Shingles infection (varicella-zoster virus).  Your chest pain may come and go. It may also be constant. Your health care provider will do tests and other studies to find the cause of your pain. Treatment will depend on the cause of your chest pain.    Follow these instructions at home:  Medicines    Take over-the-counter and prescription medicines only as told by your health care provider.  If you were prescribed an antibiotic medicine, take it as told by your health care provider. Do not stop taking the antibiotic even if you start to feel better.  Activity    Avoid any activities that cause chest pain.  Do not lift anything that is heavier than 10 lb (4.5 kg), or the limit that you are told, until your health care provider says that it is safe.  Rest as directed by your health care provider.  Return to your normal activities only as told by your health care provider. Ask your health care provider what activities are safe for you.  Lifestyle    A plate along with examples of foods in a healthy diet.  Silhouette of a person sitting on the floor doing yoga.  Do not use any products that contain nicotine or tobacco, such as cigarettes, e-cigarettes, and chewing tobacco. If you need help quitting, ask your health care provider.  Do not drink alcohol.  Make healthy lifestyle changes as recommended. These may include:  Getting regular exercise. Ask your health care provider to suggest some exercises that are safe for you.  Eating a heart-healthy diet. This includes plenty of fresh fruits and vegetables, whole grains, low-fat (lean) protein, and low-fat dairy products. A dietitian can help you find healthy eating options.  Maintaining a healthy weight.  Managing any other health conditions you may have, such as high blood pressure (hypertension) or diabetes.  Reducing stress, such as with yoga or relaxation techniques.  General instructions    Pay attention to any changes in your symptoms.  It is up to you to get the results of any tests that were done. Ask your health care provider, or the department that is doing the tests, when your results will be ready.  Keep all follow-up visits as told by your health care provider. This is important.  You may be asked to go for further testing if your chest pain does not go away.  Contact a health care provider if:  Your chest pain does not go away.  You feel depressed.  You have a fever.  You notice changes in your symptoms or develop new symptoms.  Get help right away if:  Your chest pain gets worse.  You have a cough that gets worse, or you cough up blood.  You have severe pain in your abdomen.  You faint.  You have sudden, unexplained chest discomfort.  You have sudden, unexplained discomfort in your arms, back, neck, or jaw.  You have shortness of breath at any time.  You suddenly start to sweat, or your skin gets clammy.  You feel nausea or you vomit.  You suddenly feel lightheaded or dizzy.  You have severe weakness, or unexplained weakness or fatigue.  Your heart begins to beat quickly, or it feels like it is skipping beats.  These symptoms may represent a serious problem that is an emergency. Do not wait to see if the symptoms will go away. Get medical help right away. Call your local emergency services (911 in the [place] Do not drive yourself to the hospital.    Summary  Chest pain can be caused by a condition that is serious and requires urgent treatment. It may also be caused by something that is not life-threatening.  Your health care provider may do lab tests and other studies to find the cause of your pain.  Follow your health care provider's instructions on taking medicines, making lifestyle changes, and getting emergency treatment if symptoms become worse.  Keep all follow-up visits as told by your health care provider. This includes visits for any further testing if your chest pain does not go away.  This information is not intended to replace advice given to you by your health care provider. Make sure you discuss any questions you have with your health care provider.

## 2024-05-21 ENCOUNTER — APPOINTMENT (OUTPATIENT)
Dept: OTOLARYNGOLOGY | Facility: CLINIC | Age: 64
End: 2024-05-21

## 2024-07-12 ENCOUNTER — APPOINTMENT (OUTPATIENT)
Dept: OTOLARYNGOLOGY | Facility: CLINIC | Age: 64
End: 2024-07-12

## 2024-08-08 ENCOUNTER — RX RENEWAL (OUTPATIENT)
Age: 64
End: 2024-08-08

## 2024-09-13 NOTE — ED ADULT NURSE NOTE - CADM [facility] IS PREGNANT YN
Dental Clearance for Cardiac Surgery     HPI:   This patient is a 72 year old male with a history of anterior STEMI s/p PCI to the pLAD on 10/26/23 with a VT/VF arrest the next day (10/27) with patent stents and unchanged anatomy on repeat angiogram. Placed on amiodarone and discharged 11/03/23, ICD was not indicated as this was within 48h of his MI. His EF prior to discharge was 20-30% with a large area of akinesis in the LAD, placed on apixaban due to this (Apixaban dcd on 7/5/24). Has had multiple admissions for HF exacerbation last year.  Admitted on 8/30/24. He presents for 2 weeks of worsening fatigue; found to have BNP of 16k and L pleural effusion. Admitted for diuresis and RHC     Dental History and Findings Assessment:  Poor dentition, No remaining teeth are restorable using clinical examination and dental CT. (See note of 9/6/24)    Plan:  [facility]. The Dental Service plan was to bring the patient to the clinic for 2 visits on back to back days to extract the remaining teeth.   #3,4,5,6,11,12,13,14,18,19,21,22,23,24,25,26,27,29,30    19 teeth with alveoloplasty  The plan was carried out on 9/10/24 and 9/11/24 and all remaining teeth were extracted. The patient tolerated the dental surgery well.    Dental Service Recommendations:  The patient is now cleared from a dental perspective for cardiac surgery  Any post operative oral bleeding is best controlled by pressure on moist gauze.  Diet soft advance as tolerated; avoid very hot foods, chips or other sharp foods, with no remaining teeth this patient may consider slower advances from soft diet.  Normal pain control for a procedure like this is Ibuprofen and Tylenol.  Consider chlorhexidine oral rinse twice daily for the next 4-6 weeks.    Maxwell Jean DDS  Staff Attending Dentist [facility]   No

## 2024-09-26 NOTE — ED ADULT TRIAGE NOTE - CCCP TRG CHIEF CMPLNT
Quality 226: Preventive Care And Screening: Tobacco Use: Screening And Cessation Intervention: Patient screened for tobacco use and is an ex/non-smoker
Detail Level: Simple
palpitations

## 2024-11-14 ENCOUNTER — EMERGENCY (EMERGENCY)
Facility: HOSPITAL | Age: 64
LOS: 1 days | Discharge: HOME | End: 2024-11-14
Attending: EMERGENCY MEDICINE | Admitting: EMERGENCY MEDICINE
Payer: COMMERCIAL

## 2024-11-14 VITALS
HEIGHT: 66 IN | DIASTOLIC BLOOD PRESSURE: 95 MMHG | TEMPERATURE: 98 F | HEART RATE: 78 BPM | RESPIRATION RATE: 18 BRPM | SYSTOLIC BLOOD PRESSURE: 170 MMHG | WEIGHT: 181.44 LBS | OXYGEN SATURATION: 99 %

## 2024-11-14 VITALS
DIASTOLIC BLOOD PRESSURE: 77 MMHG | HEART RATE: 62 BPM | TEMPERATURE: 98 F | OXYGEN SATURATION: 98 % | RESPIRATION RATE: 18 BRPM | SYSTOLIC BLOOD PRESSURE: 119 MMHG

## 2024-11-14 DIAGNOSIS — Z90.12 ACQUIRED ABSENCE OF LEFT BREAST AND NIPPLE: Chronic | ICD-10-CM

## 2024-11-14 LAB
ACETONE SERPL-MCNC: NEGATIVE — SIGNIFICANT CHANGE UP
ALBUMIN SERPL ELPH-MCNC: 3.9 G/DL — SIGNIFICANT CHANGE UP (ref 3.3–5)
ALP SERPL-CCNC: 128 U/L — HIGH (ref 30–120)
ALT FLD-CCNC: 22 U/L — SIGNIFICANT CHANGE UP (ref 10–60)
ANION GAP SERPL CALC-SCNC: 8 MMOL/L — SIGNIFICANT CHANGE UP (ref 5–17)
APPEARANCE UR: CLEAR — SIGNIFICANT CHANGE UP
AST SERPL-CCNC: 14 U/L — SIGNIFICANT CHANGE UP (ref 10–40)
BACTERIA # UR AUTO: ABNORMAL /HPF
BASE EXCESS BLDV CALC-SCNC: 1.2 MMOL/L — SIGNIFICANT CHANGE UP (ref -2–3)
BASOPHILS # BLD AUTO: 0.04 K/UL — SIGNIFICANT CHANGE UP (ref 0–0.2)
BASOPHILS NFR BLD AUTO: 0.6 % — SIGNIFICANT CHANGE UP (ref 0–2)
BILIRUB SERPL-MCNC: 0.7 MG/DL — SIGNIFICANT CHANGE UP (ref 0.2–1.2)
BILIRUB UR-MCNC: NEGATIVE — SIGNIFICANT CHANGE UP
BUN SERPL-MCNC: 13 MG/DL — SIGNIFICANT CHANGE UP (ref 7–23)
CALCIUM SERPL-MCNC: 11.5 MG/DL — HIGH (ref 8.4–10.5)
CHLORIDE SERPL-SCNC: 104 MMOL/L — SIGNIFICANT CHANGE UP (ref 96–108)
CO2 SERPL-SCNC: 30 MMOL/L — SIGNIFICANT CHANGE UP (ref 22–31)
COLOR SPEC: YELLOW — SIGNIFICANT CHANGE UP
CREAT SERPL-MCNC: 1.02 MG/DL — SIGNIFICANT CHANGE UP (ref 0.5–1.3)
DIFF PNL FLD: NEGATIVE — SIGNIFICANT CHANGE UP
EGFR: 61 ML/MIN/1.73M2 — SIGNIFICANT CHANGE UP
EGFR: 61 ML/MIN/1.73M2 — SIGNIFICANT CHANGE UP
EOSINOPHIL # BLD AUTO: 0.06 K/UL — SIGNIFICANT CHANGE UP (ref 0–0.5)
EOSINOPHIL NFR BLD AUTO: 0.8 % — SIGNIFICANT CHANGE UP (ref 0–6)
EPI CELLS # UR: PRESENT
GAS PNL BLDV: SIGNIFICANT CHANGE UP
GLUCOSE SERPL-MCNC: 131 MG/DL — HIGH (ref 70–99)
GLUCOSE UR QL: NEGATIVE MG/DL — SIGNIFICANT CHANGE UP
HCO3 BLDV-SCNC: 26 MMOL/L — SIGNIFICANT CHANGE UP (ref 22–29)
HCT VFR BLD CALC: 34.7 % — SIGNIFICANT CHANGE UP (ref 34.5–45)
HGB BLD-MCNC: 11.8 G/DL — SIGNIFICANT CHANGE UP (ref 11.5–15.5)
IMM GRANULOCYTES NFR BLD AUTO: 0.4 % — SIGNIFICANT CHANGE UP (ref 0–0.9)
KETONES UR-MCNC: NEGATIVE MG/DL — SIGNIFICANT CHANGE UP
LEUKOCYTE ESTERASE UR-ACNC: ABNORMAL
LIDOCAIN IGE QN: 20 U/L — SIGNIFICANT CHANGE UP (ref 16–77)
LYMPHOCYTES # BLD AUTO: 2.24 K/UL — SIGNIFICANT CHANGE UP (ref 1–3.3)
LYMPHOCYTES # BLD AUTO: 31.3 % — SIGNIFICANT CHANGE UP (ref 13–44)
MCHC RBC-ENTMCNC: 30.7 PG — SIGNIFICANT CHANGE UP (ref 27–34)
MCHC RBC-ENTMCNC: 34 G/DL — SIGNIFICANT CHANGE UP (ref 32–36)
MCV RBC AUTO: 90.4 FL — SIGNIFICANT CHANGE UP (ref 80–100)
MONOCYTES # BLD AUTO: 0.68 K/UL — SIGNIFICANT CHANGE UP (ref 0–0.9)
MONOCYTES NFR BLD AUTO: 9.5 % — SIGNIFICANT CHANGE UP (ref 2–14)
NEUTROPHILS # BLD AUTO: 4.1 K/UL — SIGNIFICANT CHANGE UP (ref 1.8–7.4)
NEUTROPHILS NFR BLD AUTO: 57.4 % — SIGNIFICANT CHANGE UP (ref 43–77)
NITRITE UR-MCNC: NEGATIVE — SIGNIFICANT CHANGE UP
NRBC # BLD: 0 /100 WBCS — SIGNIFICANT CHANGE UP (ref 0–0)
NRBC BLD-RTO: 0 /100 WBCS — SIGNIFICANT CHANGE UP (ref 0–0)
PCO2 BLDV: 42 MMHG — SIGNIFICANT CHANGE UP (ref 39–42)
PH BLDV: 7.4 — SIGNIFICANT CHANGE UP (ref 7.32–7.43)
PH UR: 6.5 — SIGNIFICANT CHANGE UP (ref 5–8)
PLATELET # BLD AUTO: 251 K/UL — SIGNIFICANT CHANGE UP (ref 150–400)
PO2 BLDV: 42 MMHG — SIGNIFICANT CHANGE UP (ref 25–45)
POTASSIUM SERPL-MCNC: 4 MMOL/L — SIGNIFICANT CHANGE UP (ref 3.5–5.3)
POTASSIUM SERPL-SCNC: 4 MMOL/L — SIGNIFICANT CHANGE UP (ref 3.5–5.3)
PROT SERPL-MCNC: 8.5 G/DL — HIGH (ref 6–8.3)
PROT UR-MCNC: NEGATIVE MG/DL — SIGNIFICANT CHANGE UP
RBC # BLD: 3.84 M/UL — SIGNIFICANT CHANGE UP (ref 3.8–5.2)
RBC # FLD: 13.8 % — SIGNIFICANT CHANGE UP (ref 10.3–14.5)
RBC CASTS # UR COMP ASSIST: 0 /HPF — SIGNIFICANT CHANGE UP (ref 0–4)
SAO2 % BLDV: 73.5 % — SIGNIFICANT CHANGE UP (ref 67–88)
SODIUM SERPL-SCNC: 142 MMOL/L — SIGNIFICANT CHANGE UP (ref 135–145)
SP GR SPEC: 1.01 — SIGNIFICANT CHANGE UP (ref 1–1.03)
TROPONIN I, HIGH SENSITIVITY RESULT: 6.4 NG/L — SIGNIFICANT CHANGE UP
UROBILINOGEN FLD QL: 1 MG/DL — SIGNIFICANT CHANGE UP (ref 0.2–1)
WBC # BLD: 7.15 K/UL — SIGNIFICANT CHANGE UP (ref 3.8–10.5)
WBC # FLD AUTO: 7.15 K/UL — SIGNIFICANT CHANGE UP (ref 3.8–10.5)
WBC UR QL: 2 /HPF — SIGNIFICANT CHANGE UP (ref 0–5)

## 2024-11-14 PROCEDURE — 74176 CT ABD & PELVIS W/O CONTRAST: CPT | Mod: MC

## 2024-11-14 PROCEDURE — 81001 URINALYSIS AUTO W/SCOPE: CPT

## 2024-11-14 PROCEDURE — 71045 X-RAY EXAM CHEST 1 VIEW: CPT | Mod: 26

## 2024-11-14 PROCEDURE — 93010 ELECTROCARDIOGRAM REPORT: CPT

## 2024-11-14 PROCEDURE — 96375 TX/PRO/DX INJ NEW DRUG ADDON: CPT

## 2024-11-14 PROCEDURE — 85025 COMPLETE CBC W/AUTO DIFF WBC: CPT

## 2024-11-14 PROCEDURE — 84484 ASSAY OF TROPONIN QUANT: CPT

## 2024-11-14 PROCEDURE — 83690 ASSAY OF LIPASE: CPT

## 2024-11-14 PROCEDURE — 99285 EMERGENCY DEPT VISIT HI MDM: CPT | Mod: 25

## 2024-11-14 PROCEDURE — 74176 CT ABD & PELVIS W/O CONTRAST: CPT | Mod: 26,MC

## 2024-11-14 PROCEDURE — 99284 EMERGENCY DEPT VISIT MOD MDM: CPT

## 2024-11-14 PROCEDURE — 80053 COMPREHEN METABOLIC PANEL: CPT

## 2024-11-14 PROCEDURE — 36415 COLL VENOUS BLD VENIPUNCTURE: CPT

## 2024-11-14 PROCEDURE — 93005 ELECTROCARDIOGRAM TRACING: CPT

## 2024-11-14 PROCEDURE — 82803 BLOOD GASES ANY COMBINATION: CPT

## 2024-11-14 PROCEDURE — 71045 X-RAY EXAM CHEST 1 VIEW: CPT

## 2024-11-14 PROCEDURE — 82009 KETONE BODYS QUAL: CPT

## 2024-11-14 PROCEDURE — 96374 THER/PROPH/DIAG INJ IV PUSH: CPT

## 2024-11-14 RX ORDER — ONDANSETRON HCL/PF 4 MG/2 ML
4 VIAL (ML) INJECTION ONCE
Refills: 0 | Status: DISCONTINUED | OUTPATIENT
Start: 2024-11-14 | End: 2024-11-17

## 2024-11-14 RX ORDER — ACETAMINOPHEN 500 MG/5ML
1000 LIQUID (ML) ORAL ONCE
Refills: 0 | Status: COMPLETED | OUTPATIENT
Start: 2024-11-14 | End: 2024-11-14

## 2024-11-14 RX ORDER — SUCRALFATE 1 G
1 TABLET ORAL
Qty: 30 | Refills: 0
Start: 2024-11-14 | End: 2024-11-23

## 2024-11-14 RX ADMIN — Medication 1000 MILLILITER(S): at 11:59

## 2024-11-14 RX ADMIN — Medication 20 MILLIGRAM(S): at 11:59

## 2024-11-14 RX ADMIN — Medication 400 MILLIGRAM(S): at 12:00

## 2025-02-04 ENCOUNTER — RX RENEWAL (OUTPATIENT)
Age: 65
End: 2025-02-04

## 2025-02-05 ENCOUNTER — EMERGENCY (EMERGENCY)
Facility: HOSPITAL | Age: 65
LOS: 1 days | Discharge: HOME | End: 2025-02-05
Attending: EMERGENCY MEDICINE | Admitting: EMERGENCY MEDICINE
Payer: COMMERCIAL

## 2025-02-05 VITALS
HEART RATE: 86 BPM | SYSTOLIC BLOOD PRESSURE: 150 MMHG | RESPIRATION RATE: 16 BRPM | OXYGEN SATURATION: 98 % | DIASTOLIC BLOOD PRESSURE: 90 MMHG | TEMPERATURE: 98 F

## 2025-02-05 VITALS
SYSTOLIC BLOOD PRESSURE: 150 MMHG | HEIGHT: 66 IN | WEIGHT: 177.91 LBS | TEMPERATURE: 98 F | OXYGEN SATURATION: 98 % | DIASTOLIC BLOOD PRESSURE: 84 MMHG | RESPIRATION RATE: 14 BRPM | HEART RATE: 89 BPM

## 2025-02-05 DIAGNOSIS — Z90.12 ACQUIRED ABSENCE OF LEFT BREAST AND NIPPLE: Chronic | ICD-10-CM

## 2025-02-05 DIAGNOSIS — Z90.49 ACQUIRED ABSENCE OF OTHER SPECIFIED PARTS OF DIGESTIVE TRACT: Chronic | ICD-10-CM

## 2025-02-05 LAB
ALBUMIN SERPL ELPH-MCNC: 3.6 G/DL — SIGNIFICANT CHANGE UP (ref 3.3–5)
ALP SERPL-CCNC: 135 U/L — HIGH (ref 30–120)
ALT FLD-CCNC: 21 U/L — SIGNIFICANT CHANGE UP (ref 10–60)
ANION GAP SERPL CALC-SCNC: 8 MMOL/L — SIGNIFICANT CHANGE UP (ref 5–17)
APPEARANCE UR: CLEAR — SIGNIFICANT CHANGE UP
AST SERPL-CCNC: 20 U/L — SIGNIFICANT CHANGE UP (ref 10–40)
BACTERIA # UR AUTO: ABNORMAL /HPF
BASOPHILS # BLD AUTO: 0.02 K/UL — SIGNIFICANT CHANGE UP (ref 0–0.2)
BASOPHILS NFR BLD AUTO: 0.3 % — SIGNIFICANT CHANGE UP (ref 0–2)
BILIRUB SERPL-MCNC: 0.6 MG/DL — SIGNIFICANT CHANGE UP (ref 0.2–1.2)
BILIRUB UR-MCNC: NEGATIVE — SIGNIFICANT CHANGE UP
BUN SERPL-MCNC: 11 MG/DL — SIGNIFICANT CHANGE UP (ref 7–23)
CALCIUM SERPL-MCNC: 11.3 MG/DL — HIGH (ref 8.4–10.5)
CHLORIDE SERPL-SCNC: 106 MMOL/L — SIGNIFICANT CHANGE UP (ref 96–108)
CO2 SERPL-SCNC: 28 MMOL/L — SIGNIFICANT CHANGE UP (ref 22–31)
COLOR SPEC: YELLOW — SIGNIFICANT CHANGE UP
CREAT SERPL-MCNC: 1.09 MG/DL — SIGNIFICANT CHANGE UP (ref 0.5–1.3)
DIFF PNL FLD: NEGATIVE — SIGNIFICANT CHANGE UP
EGFR: 56 ML/MIN/1.73M2 — LOW
EOSINOPHIL # BLD AUTO: 0.15 K/UL — SIGNIFICANT CHANGE UP (ref 0–0.5)
EOSINOPHIL NFR BLD AUTO: 2.1 % — SIGNIFICANT CHANGE UP (ref 0–6)
EPI CELLS # UR: PRESENT
GLUCOSE SERPL-MCNC: 101 MG/DL — HIGH (ref 70–99)
GLUCOSE UR QL: NEGATIVE MG/DL — SIGNIFICANT CHANGE UP
HCT VFR BLD CALC: 33 % — LOW (ref 34.5–45)
HGB BLD-MCNC: 11.1 G/DL — LOW (ref 11.5–15.5)
IMM GRANULOCYTES NFR BLD AUTO: 0.4 % — SIGNIFICANT CHANGE UP (ref 0–0.9)
KETONES UR-MCNC: NEGATIVE MG/DL — SIGNIFICANT CHANGE UP
LEUKOCYTE ESTERASE UR-ACNC: ABNORMAL
LIDOCAIN IGE QN: 20 U/L — SIGNIFICANT CHANGE UP (ref 16–77)
LYMPHOCYTES # BLD AUTO: 2.15 K/UL — SIGNIFICANT CHANGE UP (ref 1–3.3)
LYMPHOCYTES # BLD AUTO: 29.9 % — SIGNIFICANT CHANGE UP (ref 13–44)
MCHC RBC-ENTMCNC: 30.3 PG — SIGNIFICANT CHANGE UP (ref 27–34)
MCHC RBC-ENTMCNC: 33.6 G/DL — SIGNIFICANT CHANGE UP (ref 32–36)
MCV RBC AUTO: 90.2 FL — SIGNIFICANT CHANGE UP (ref 80–100)
MONOCYTES # BLD AUTO: 0.76 K/UL — SIGNIFICANT CHANGE UP (ref 0–0.9)
MONOCYTES NFR BLD AUTO: 10.6 % — SIGNIFICANT CHANGE UP (ref 2–14)
NEUTROPHILS # BLD AUTO: 4.08 K/UL — SIGNIFICANT CHANGE UP (ref 1.8–7.4)
NEUTROPHILS NFR BLD AUTO: 56.7 % — SIGNIFICANT CHANGE UP (ref 43–77)
NITRITE UR-MCNC: NEGATIVE — SIGNIFICANT CHANGE UP
NRBC # BLD: 0 /100 WBCS — SIGNIFICANT CHANGE UP (ref 0–0)
NRBC BLD-RTO: 0 /100 WBCS — SIGNIFICANT CHANGE UP (ref 0–0)
PH UR: 5.5 — SIGNIFICANT CHANGE UP (ref 5–8)
PLATELET # BLD AUTO: 253 K/UL — SIGNIFICANT CHANGE UP (ref 150–400)
POTASSIUM SERPL-MCNC: 4 MMOL/L — SIGNIFICANT CHANGE UP (ref 3.5–5.3)
POTASSIUM SERPL-SCNC: 4 MMOL/L — SIGNIFICANT CHANGE UP (ref 3.5–5.3)
PROT SERPL-MCNC: 8 G/DL — SIGNIFICANT CHANGE UP (ref 6–8.3)
PROT UR-MCNC: NEGATIVE MG/DL — SIGNIFICANT CHANGE UP
RBC # BLD: 3.66 M/UL — LOW (ref 3.8–5.2)
RBC # FLD: 13.6 % — SIGNIFICANT CHANGE UP (ref 10.3–14.5)
RBC CASTS # UR COMP ASSIST: 0 /HPF — SIGNIFICANT CHANGE UP (ref 0–4)
SODIUM SERPL-SCNC: 142 MMOL/L — SIGNIFICANT CHANGE UP (ref 135–145)
SP GR SPEC: 1.01 — SIGNIFICANT CHANGE UP (ref 1–1.03)
TROPONIN I, HIGH SENSITIVITY RESULT: 5.8 NG/L — SIGNIFICANT CHANGE UP
TROPONIN I, HIGH SENSITIVITY RESULT: 8.3 NG/L — SIGNIFICANT CHANGE UP
UROBILINOGEN FLD QL: 1 MG/DL — SIGNIFICANT CHANGE UP (ref 0.2–1)
WBC # BLD: 7.19 K/UL — SIGNIFICANT CHANGE UP (ref 3.8–10.5)
WBC # FLD AUTO: 7.19 K/UL — SIGNIFICANT CHANGE UP (ref 3.8–10.5)
WBC UR QL: 4 /HPF — SIGNIFICANT CHANGE UP (ref 0–5)

## 2025-02-05 PROCEDURE — 74176 CT ABD & PELVIS W/O CONTRAST: CPT | Mod: 26

## 2025-02-05 PROCEDURE — 93010 ELECTROCARDIOGRAM REPORT: CPT

## 2025-02-05 PROCEDURE — 81001 URINALYSIS AUTO W/SCOPE: CPT

## 2025-02-05 PROCEDURE — 99285 EMERGENCY DEPT VISIT HI MDM: CPT | Mod: 25

## 2025-02-05 PROCEDURE — 83690 ASSAY OF LIPASE: CPT

## 2025-02-05 PROCEDURE — 84484 ASSAY OF TROPONIN QUANT: CPT

## 2025-02-05 PROCEDURE — 74176 CT ABD & PELVIS W/O CONTRAST: CPT | Mod: MC

## 2025-02-05 PROCEDURE — 85025 COMPLETE CBC W/AUTO DIFF WBC: CPT

## 2025-02-05 PROCEDURE — 36415 COLL VENOUS BLD VENIPUNCTURE: CPT

## 2025-02-05 PROCEDURE — 99285 EMERGENCY DEPT VISIT HI MDM: CPT

## 2025-02-05 PROCEDURE — 80053 COMPREHEN METABOLIC PANEL: CPT

## 2025-02-05 PROCEDURE — 96361 HYDRATE IV INFUSION ADD-ON: CPT

## 2025-02-05 PROCEDURE — 96374 THER/PROPH/DIAG INJ IV PUSH: CPT

## 2025-02-05 PROCEDURE — 93005 ELECTROCARDIOGRAM TRACING: CPT

## 2025-02-05 PROCEDURE — 96375 TX/PRO/DX INJ NEW DRUG ADDON: CPT

## 2025-02-05 RX ORDER — LAMOTRIGINE 100 MG/1
1 TABLET ORAL
Refills: 0 | DISCHARGE

## 2025-02-05 RX ORDER — BACTERIOSTATIC SODIUM CHLORIDE 0.9 %
1000 VIAL (ML) INJECTION ONCE
Refills: 0 | Status: COMPLETED | OUTPATIENT
Start: 2025-02-05 | End: 2025-02-05

## 2025-02-05 RX ORDER — TEZEPELUMAB-EKKO 210 MG/1.9ML
210 INJECTION, SOLUTION SUBCUTANEOUS
Refills: 0 | DISCHARGE

## 2025-02-05 RX ORDER — LAMOTRIGINE 150 MG/1
1 TABLET ORAL
Refills: 0 | DISCHARGE

## 2025-02-05 RX ORDER — ONDANSETRON 4 MG/1
1 TABLET, ORALLY DISINTEGRATING ORAL
Qty: 1 | Refills: 0
Start: 2025-02-05

## 2025-02-05 RX ORDER — OMEPRAZOLE 20 MG/1
1 CAPSULE, DELAYED RELEASE ORAL
Qty: 14 | Refills: 0
Start: 2025-02-05 | End: 2025-02-18

## 2025-02-05 RX ORDER — PANTOPRAZOLE 20 MG/1
40 TABLET, DELAYED RELEASE ORAL ONCE
Refills: 0 | Status: COMPLETED | OUTPATIENT
Start: 2025-02-05 | End: 2025-02-05

## 2025-02-05 RX ORDER — ONDANSETRON 4 MG/1
4 TABLET, ORALLY DISINTEGRATING ORAL ONCE
Refills: 0 | Status: COMPLETED | OUTPATIENT
Start: 2025-02-05 | End: 2025-02-05

## 2025-02-05 RX ORDER — ACETAMINOPHEN 160 MG/5ML
1000 SUSPENSION ORAL ONCE
Refills: 0 | Status: COMPLETED | OUTPATIENT
Start: 2025-02-05 | End: 2025-02-05

## 2025-02-05 RX ORDER — CETIRIZINE HCL 10 MG
1 TABLET ORAL
Refills: 0 | DISCHARGE

## 2025-02-05 RX ADMIN — ONDANSETRON 4 MILLIGRAM(S): 4 TABLET, ORALLY DISINTEGRATING ORAL at 11:32

## 2025-02-05 RX ADMIN — ACETAMINOPHEN 400 MILLIGRAM(S): 160 SUSPENSION ORAL at 11:33

## 2025-02-05 RX ADMIN — Medication 1000 MILLILITER(S): at 11:31

## 2025-02-05 RX ADMIN — PANTOPRAZOLE 40 MILLIGRAM(S): 20 TABLET, DELAYED RELEASE ORAL at 11:33

## 2025-02-05 RX ADMIN — ACETAMINOPHEN 1000 MILLIGRAM(S): 160 SUSPENSION ORAL at 12:33

## 2025-02-05 RX ADMIN — Medication 1000 MILLILITER(S): at 12:31

## 2025-02-05 RX ADMIN — ACETAMINOPHEN 1000 MILLIGRAM(S): 160 SUSPENSION ORAL at 11:48

## 2025-02-05 NOTE — ED PROVIDER NOTE - CARE PROVIDER_API CALL
Mendelson, Robert I  Cardiology  [address] [place] [address], [address]  [place] [address]  Phone: [phone]  Fax: [phone]  Follow Up Time: 1-3 Days    RIYA GARRIDO  [address] [place] [address]  Phone: ([phone]  Fax: ()-  Follow Up Time: 1-3 Days

## 2025-02-05 NOTE — ED PROVIDER NOTE - PROGRESS NOTE DETAILS
Reevaluated patient at bedside.  Patient feeling improved, wants ot be d/c home to [facility] with her GI and cardio (trisha and mendelson) as outpatient.  Discussed the results of all diagnostic testing in ED and copies of all reports given.   An opportunity to ask questions was given.  Discussed the importance of prompt, close medical follow-up.  Patient will return with any changes, concerns or persistent / worsening symptoms.  Understanding of all instructions verbalized.

## 2025-02-05 NOTE — ED PROVIDER NOTE - DIFFERENTIAL DIAGNOSIS
Differential including but not limited to MI ACS gastritis reflux pancreatitis enteritis medication side Differential Diagnosis

## 2025-02-05 NOTE — ED ADULT TRIAGE NOTE - BP NONINVASIVE SYSTOLIC (MM HG)
Pt reported mid-epigastric abdominal pain, and not chest pain  Given abnormal LFTS (previously normal 4 weeks ago) along with CTA findings that showed gallbladder is mildly distended noting there may be cholelithiasis or sludge near the gallbladder neck which is concerning for cholecystitis as source of pain  Lipase was normal  RUQ abdominal U/S and HIDA consistent with cholecystitis  Pt spiked fever and was started on empiric Zosyn  Blood culture positive for GNR from 5/31  Supportive care with IVF, pain control and anti-emetics  Appreciate GI following and MRCP was negative for any retained stones  Plan was for cholecystectomy yesterday but delayed until today  Repeat blood culture were done today to assess for clearance- + on 6/2. Will repeat today- 6/3.+.    150

## 2025-02-05 NOTE — ED PROVIDER NOTE - CLINICAL SUMMARY MEDICAL DECISION MAKING FREE TEXT BOX
Patient complaining of epigastric abdominal pain associated with nausea since February 1.  Patient relates she had been on Mounjaro for 1 month increased her dose on Saturday and later that day he developed symptoms.  Patient denies fevers chills chest pain short of breath cough vomiting diarrhea urinary complaints.  PMD Hilda    Plan EKG labs CT abdomen pelvis IV fluids Ofirmev Zofran Protonix    Differential including but not limited to MI ACS gastritis reflux pancreatitis enteritis medication side

## 2025-02-05 NOTE — ED PROVIDER NOTE - NSFOLLOWUPINSTRUCTIONS_ED_ALL_ED_FT
EPIGASTRIC PAIN - AfterCare(R) Instructions(ER/ED)    Epigastric Pain    WHAT YOU NEED TO KNOW:    Epigastric pain is felt in the middle of the upper abdomen, between the ribs and the bellybutton. The pain may be mild or severe. Pain may spread from or to another part of your body. Epigastric pain may be a sign of a serious health problem that needs to be treated.    DISCHARGE INSTRUCTIONS:    Call 911 for any of the following:    You have any of the following signs of a heart attack:  Squeezing, pressure, or pain in your chest    You may also have any of the following:  Discomfort or pain in your back, neck, jaw, stomach, or arm    Shortness of breath    Nausea or vomiting    Lightheadedness or a sudden cold sweat    You have severe pain that radiates to your jaw or back.  Return to the emergency department if:    You have severe pain that starts suddenly and quickly gets worse.    You cannot have a bowel movement and are vomiting.    You vomit or cough up blood.    You see blood in your urine or bowel movement.    You feel drowsy and your breathing is slower than usual.  Contact your healthcare provider if:    You have a fever or chills.    You have yellowing of your skin or the whites of your eyes.    You vomit often or several times in a row.    You lose weight without trying.    You have symptoms for longer than 2 weeks.    You have questions or concerns about your condition or care.  Medicines:    Medicines may be given to treat pain or stop vomiting. You may also need medicines to reduce or control stomach acid, or treat an infection.    Take your medicine as directed. Contact your healthcare provider if you think your medicine is not helping or if you have side effects. Tell your provider if you are allergic to any medicine. Keep a list of the medicines, vitamins, and herbs you take. Include the amounts, and when and why you take them. Bring the list or the pill bottles to follow-up visits. Carry your medicine list with you in case of an emergency.  Follow up with your healthcare provider as directed: Write down your questions so you remember to ask them during your visits.    Manage your symptoms:    Keep a record of your symptoms. Include when the pain starts, how long it lasts, and if it is sharp or dull. Also include any foods you ate or activities you did before the pain started. Keep track of anything that helped the pain.    Eat a variety of healthy foods. Healthy foods include fruits, vegetables, whole-grain breads, low-fat dairy products, beans, lean meats, and fish. Ask if you need to be on a special diet. Certain foods may cause your pain, such as alcohol or foods that are high in fat. You may need to eat smaller meals and to eat more often than usual.    Drink liquids as directed. Ask how much liquid to drink each day and which liquids are best for you. Do not have drinks that contain alcohol or caffeine.

## 2025-02-05 NOTE — ED PROVIDER NOTE - PATIENT PORTAL LINK FT
You can access the FollowMyHealth Patient Portal offered by [facility] by registering at the following website: [email or web address]. By joining [employer]’s FollowMyHealth portal, you will also be able to view your health information using other applications (apps) compatible with our system.

## 2025-02-05 NOTE — ED PROVIDER NOTE - CONSTITUTIONAL NEGATIVE STATEMENT, MLM
no fever and no chills. Otezla Pregnancy And Lactation Text: This medication is Pregnancy Category C and it isn't known if it is safe during pregnancy. It is unknown if it is excreted in breast milk.

## 2025-02-05 NOTE — ED PROVIDER NOTE - PROVIDER TOKENS
PROVIDER:[TOKEN:[[id number]],FOLLOWUP:[1-3 Days]],PROVIDER:[TOKEN:[[id number]],FOLLOWUP:[1-3 Days]]

## 2025-02-05 NOTE — ED PROVIDER NOTE - PR
Call mom and says he been doing better and mom want to know should he get tested for lyme after he done with the amoxacillin,the test from urgent care came back neg, but she want to know should he get tested for lyme 178

## 2025-04-01 ENCOUNTER — APPOINTMENT (OUTPATIENT)
Dept: OTOLARYNGOLOGY | Facility: CLINIC | Age: 65
End: 2025-04-01

## 2025-07-24 ENCOUNTER — RX RENEWAL (OUTPATIENT)
Age: 65
End: 2025-07-24